# Patient Record
Sex: FEMALE | Race: WHITE | NOT HISPANIC OR LATINO | Employment: UNEMPLOYED | URBAN - NONMETROPOLITAN AREA
[De-identification: names, ages, dates, MRNs, and addresses within clinical notes are randomized per-mention and may not be internally consistent; named-entity substitution may affect disease eponyms.]

---

## 2022-04-06 ENCOUNTER — TRANSCRIBE ORDERS (OUTPATIENT)
Dept: PHYSICAL THERAPY | Facility: HOSPITAL | Age: 22
End: 2022-04-06

## 2022-04-06 DIAGNOSIS — F80.9 DEVELOPMENTAL DISORDER OF SPEECH AND LANGUAGE, UNSPECIFIED: ICD-10-CM

## 2022-04-06 DIAGNOSIS — Q90.9 DOWN'S SYNDROME: Primary | ICD-10-CM

## 2022-04-12 ENCOUNTER — HOSPITAL ENCOUNTER (OUTPATIENT)
Dept: SPEECH THERAPY | Facility: HOSPITAL | Age: 22
Setting detail: THERAPIES SERIES
Discharge: HOME | End: 2022-04-12

## 2022-04-12 DIAGNOSIS — R47.1 DYSARTHRIA: Primary | ICD-10-CM

## 2022-04-12 PROCEDURE — 92523 SPEECH SOUND LANG COMPREHEN: CPT | Performed by: SPEECH-LANGUAGE PATHOLOGIST

## 2022-04-12 NOTE — THERAPY EVALUATION
Outpatient Speech Language Pathology   Adult Speech Language Cognitive Initial Evaluation  [facility]     Patient Name: Kourtney Townsend  : 2000  MRN: [id number]  Today's Date: 2022        Visit Date: 2022   There is no problem list on file for this patient.       Past Medical History:   Diagnosis Date   • ADHD (attention deficit hyperactivity disorder)    • Alopecia    • Asthma    • Down syndrome         History reviewed. No pertinent surgical history.      Visit Dx:    ICD-10-CM ICD-9-CM   1. Dysarthria  R47.1 784.51            OP SLP Assessment/Plan - 22 0807        SLP Assessment    Functional Problems Speech Language- Adult/Cognition  -EC    Impact on Function: Adult Speech Language/Cognition Restrictions in personal and social life;Difficulty communicating wants, needs, and ideas  -EC    Clinical Impression: Speech Language-Adult/Congnition Moderate:;Other (comment)   dysarthria -EC    Functional Problems Comment Kourtney has decresed intelligability that is associated with her down syndrom dx.  Her dx of ADHD also affects her ability to utilize compensatory strategies to improve her intelligability.  -EC    Clinical Impression Comments Kourtney will benefit from speech therapy to address education and implimentation of compensatory strategies to improve intelligability  -EC    SLP Diagnosis dysarthria  -EC    Prognosis Fair (comment)  -EC    Patient/caregiver participated in establishment of treatment plan and goals Yes  -EC    Patient would benefit from skilled therapy intervention Yes  -EC       SLP Plan    Frequency 1xwk  -EC    Duration till d/c  -EC    Planned CPT's? SLP INDIVIDUAL SPEECH THERAPY: 70681  -EC    Expected Duration of Therapy Session (SLP Eval) 45  -EC          User Key  (r) = Recorded By, (t) = Taken By, (c) = Cosigned By    Initials Name Provider Type    Melissa Tenorio CCC-SLP Speech and Language Pathologist                 SLP SLC Evaluation -  04/12/22 0807        Communication Assessment/Intervention    Document Type evaluation  -EC    Subjective Information no complaints  -EC    Patient Observations alert;cooperative;agree to therapy  -EC    Patient/Family/Caregiver Comments/Observations mother in attendance  -EC    Patient Effort adequate  -EC    Comment Pt graduated H.S. 5/2020 and now lives at home with mom, step dad, adult foster.  mom says she was getting speech therapy about 30 mins a month during her final years in highschool  -EC       General Information    Patient Profile Reviewed yes  -EC    Pertinent History Of Current Problem pt is verbose with run on conversations.  she demonstrates dysarthria with decreased articulatory precision, rate, prosody, and breath support.  she also has a mild tongue lateralization in conversational speech.  -EC    Precautions/Limitations, Vision WFL;for purposes of eval  -EC    Precautions/Limitations, Hearing WFL  -EC    Patient Level of Education 12 grade  -EC    Prior Level of Function-Communication cognitive-linguistic impairment;other (see comments)   downs syndrome -EC    Plans/Goals Discussed with patient;family  -EC    Barriers to Rehab cognitive status;previous functional deficit  -EC    Patient's Goals for Discharge functional communication  -EC    Family Goals for Discharge functional communication  -EC       Pain    Additional Documentation Pain Scale: Numbers Pre/Post-Treatment (Group)  -EC       Pain Scale: Numbers Pre/Post-Treatment    Pretreatment Pain Rating 0/10 - no pain  -EC    Posttreatment Pain Rating 0/10 - no pain  -EC       Oral Motor Structure and Function    Oral Motor Structure and Function mild impairment  -EC    Oral Lesions or Structural Abnormalities and/or variants down syndrom characteristis of small mouth, large tongue, imprecise articulation  -EC    Dentition Assessment natural, present and adequate  -EC    Mucosal Quality moist, healthy  -EC       Motor Speech  Assessment/Intervention    Characteristics Consistent with Dysarthria increased rate;decreased articulation;decreased prosody;decreased breath support  -EC    Conversational Speech (Communication) moderate impairment  -EC       SLP Evaluation Clinical Impressions    SLP Diagnosis dysarthria  -EC    Rehab Potential/Prognosis guarded  -EC    SLC Criteria for Skilled Therapy Interventions Met yes  -EC    Functional Impact functional impact in social situations;difficulty communicating in an emergency;restrictions in personal and social life  -EC       Recommendations    Therapy Frequency (SLP SLC) 1 day per week  -EC    Predicted Duration Therapy Intervention (Days) until discharge  -EC    Anticipated Discharge Disposition (SLP) home with assist  -EC    Communication Strategy Suggestions eliminate distractions when speaking with patient;avoid open-ended questions;other (see comments)   encourage slow rate/pacing -EC          User Key  (r) = Recorded By, (t) = Taken By, (c) = Cosigned By    Initials Name Provider Type    Melissa Tenorio CCC-SLP Speech and Language Pathologist                                OP SLP Education     Row Name 04/12/22 0807       Education    Barriers to Learning Decreased comprehension  -EC    Action Taken to Address Barriers education with mother for HEP  -EC    Education Provided Described results of evaluation;Family/caregivers expressed understanding of evaluation;Family/caregivers participated in establishing goals and treatment plan;Patient demonstrated recommended strategies;Family/caregivers demonstrated recommended strategies;Patient requires further education on strategies, risks;Family/caregivers require further education on strategies, risks  -EC    Assessed Learning needs;Learning motivation;Learning preferences;Learning readiness  -EC    Learning Motivation Moderate  -EC    Learning Method Explanation;Demonstration  -EC    Teaching Response Demonstrated understanding  -EC     Education Comments pt demonstrated modified finger pacing  -EC          User Key  (r) = Recorded By, (t) = Taken By, (c) = Cosigned By    Initials Name Effective Dates    EC Melissa Gastelum, CCC-SLP 06/16/21 -                SLP OP Goals     Row Name 04/12/22 1054 04/12/22 0807       Goal Type Needed    Goal Type Needed -- Dysarthria;Other Adult Goals  -EC       Subjective Comments    Subjective Comments -- Kourtney comes to [facility] with a lifetime of speech therapy in the school system.  She has struggled with decrased inteligability 10% to an unfamiliar listener.  Her mother often serves as her .  Kourtney actually has a large vocabulary and the ability to formulate long, run-on sentences, but her rate of speech is too fast and her prosody lacks intonational changes that help the listener gain meaning.  -EC       Dysarthria Goals     Dysarthria LTG's -- Patient will be able to communicate a message that is comprehensible to familiar/unfamiliar listeners utilizing verbal speech and augmentative strategies  -EC    Patient will be able to communicate a message that is comprehensible to familiar/unfamiliar listeners utilizing verbal speech and augmentative strategies -- 70%:  -EC    Status: Patient will be able to communicate a message that is comprehensible to familiar/unfamiliar listeners utilizing verbal speech and augmentative strategies -- New  -EC    Comments: Patient will be able to communicate a message that is comprehensible to familiar/unfamiliar listeners utilizing verbal speech and augmentative strategies -- pt speech is fast and lacks articulatory precision which affects her overall intelligabilty.  she is understood about 10% of the time in connected speech to an unfamiliar listener  -EC     Dysarthria STG's -- Patient will apply compensatory strategies to improve intelligibility of speech during in spontaneous speech  -EC    Patient will apply compensatory strategies to  improve intelligibility of speech during in spontaneous speech -- 70%:  -EC    Status: Patient will apply compensatory strategies to improve intelligibility of speech during in spontaneous speech -- New  -EC    Comments: Patient will apply compensatory strategies to improve intelligibility of speech during in spontaneous speech -- discussed finger tapping to pace patient during connected speech.  -EC       Other Goals    Other Adult Goal- 1 -- Mom will encourage HEP to facilitate carryover and report back weekly on progress  -EC    Status: Other Adult Goal- 1 -- New  -EC    Comments: Other Adult Goal- 1 -- mom encouraged to cue for slow rate and finger tapping  -EC       SLP Time Calculation    SLP Goal Re-Cert Due Date 05/12/22  -EC 05/12/22  -EC          User Key  (r) = Recorded By, (t) = Taken By, (c) = Cosigned By    Initials Name Provider Type    EC Melissa Gastelum CCC-SLP Speech and Language Pathologist                     Time Calculation:   SLP Start Time: 0807  SLP Stop Time: 0900  SLP Time Calculation (min): 53 min  Total Timed Code Minutes- SLP: 53 minute(s)  Untimed Charges  SLP Eval/Re-eval : ST Eval Speech and Production w/ Language - 56102  31807-LC Eval Speech and Production w/ Language Minutes: 53  Total Minutes  Untimed Charges Total Minutes: 53   Total Minutes: 53    Therapy Charges for Today     Code Description Service Date Service Provider Modifiers Qty    14620910613 HC ST EVAL SPEECH AND PROD W LANG  4 4/12/2022 Melissa Gastelum CCC-SLP GN 1                   SIMON Stringer  4/12/2022

## 2022-04-19 ENCOUNTER — HOSPITAL ENCOUNTER (OUTPATIENT)
Dept: SPEECH THERAPY | Facility: HOSPITAL | Age: 22
Setting detail: THERAPIES SERIES
Discharge: HOME | End: 2022-04-19

## 2022-04-19 DIAGNOSIS — R47.1 DYSARTHRIA: Primary | ICD-10-CM

## 2022-04-19 PROCEDURE — 92507 TX SP LANG VOICE COMM INDIV: CPT | Performed by: SPEECH-LANGUAGE PATHOLOGIST

## 2022-04-19 NOTE — THERAPY TREATMENT NOTE
Outpatient Speech Language Pathology   Adult Speech Language Cognitive Treatment Note  [facility]     Patient Name: Kia Townsend  : 2000  MRN: [id number]  Today's Date: 2022         Visit Date: 2022   There is no problem list on file for this patient.         Visit Dx:    ICD-10-CM ICD-9-CM   1. Dysarthria  R47.1 784.51        OP SLP Assessment/Plan - 22 1155        SLP Assessment    Functional Problems Speech Language- Adult/Cognition  -EC    Impact on Function: Adult Speech Language/Cognition Restrictions in personal and social life;Difficulty communicating wants, needs, and ideas  -EC    Clinical Impression: Speech Language-Adult/Congnition Moderate:;Other (comment)   dysarthria -EC    Functional Problems Comment kia's deficits associated with down syndrom and ADHD dx affects her ability to utilize strategies for increased intelligability  -EC    Clinical Impression Comments Kia will benefit from speech therapy to address education and implimentation of compensatory strategies to improve intelligability  -EC    SLP Diagnosis dysarthria  -EC    Prognosis Fair (comment)  -EC    Patient/caregiver participated in establishment of treatment plan and goals Yes  -EC    Patient would benefit from skilled therapy intervention Yes  -EC       SLP Plan    Frequency 1xwk  -EC    Duration till d/c  -EC    Planned CPT's? SLP INDIVIDUAL SPEECH THERAPY: 23843  -EC    Expected Duration of Therapy Session (SLP Eval) 45  -EC          User Key  (r) = Recorded By, (t) = Taken By, (c) = Cosigned By    Initials Name Provider Type    Melissa Tenorio CCC-SLP Speech and Language Pathologist                                   SLP OP Goals     Row Name 22 1357 22 1151       Goal Type Needed    Goal Type Needed -- Dysarthria  -EC       Subjective Comments    Subjective Comments -- `  -EC       Dysarthria Goals    Patient will be able to communicate a message that is  comprehensible to familiar/unfamiliar listeners utilizing verbal speech and augmentative strategies -- 70%:  -EC    Status: Patient will be able to communicate a message that is comprehensible to familiar/unfamiliar listeners utilizing verbal speech and augmentative strategies -- Progressing as expected  -EC    Comments: Patient will be able to communicate a message that is comprehensible to familiar/unfamiliar listeners utilizing verbal speech and augmentative strategies -- pt speech is fast and lacks articulatory precision which affects her overall intelligabilty.  she is understood about 10% of the time in connected speech to an unfamiliar listener  -EC     Dysarthria STG's -- Patient will apply compensatory strategies to improve intelligibility of speech during in spontaneous speech  -EC    Patient will apply compensatory strategies to improve intelligibility of speech during in spontaneous speech -- 70%:  -EC    Status: Patient will apply compensatory strategies to improve intelligibility of speech during in spontaneous speech -- Progressing as expected  -EC    Comments: Patient will apply compensatory strategies to improve intelligibility of speech during in spontaneous speech -- Mom reports that kia does not like finger tapping.  This session focused on slowing rate and pronouncing each syllable in multi-syllable words.  completed worksheet on increasing word length : (ex list, listless, listlessness)  pt performed with model and improved intelligability at multisyllable level to 60%  -EC       Other Goals    Other Adult Goal- 1 -- Mom will encourage HEP to facilitate carryover and report back weekly on progress  -EC    Status: Other Adult Goal- 1 -- Progressing as expected  -EC    Comments: Other Adult Goal- 1 -- worksheet sent home to practice slow rate with mutisyllable words  -EC       SLP Time Calculation    SLP Goal Re-Cert Due Date 05/12/22  -EC 05/12/22  -EC          User Key  (r) = Recorded By,  (t) = Taken By, (c) = Cosigned By    Initials Name Provider Type    EC Melissa Gastelum CCC-SLP Speech and Language Pathologist               OP SLP Education     Row Name 04/19/22 1155       Education    Barriers to Learning Decreased comprehension  -EC    Action Taken to Address Barriers Mother attended session  -EC    Education Provided Patient demonstrated recommended strategies;Family/caregivers demonstrated recommended strategies;Patient requires further education on strategies, risks;Family/caregivers require further education on strategies, risks  -EC    Assessed Learning needs;Learning motivation;Learning preferences;Learning readiness  -EC    Learning Motivation Moderate  -EC    Learning Method Explanation;Demonstration  -EC    Teaching Response Verbalized understanding  -EC    Education Comments pt participated in strategies to decrease rate in multi syllablic words and short phrases  -EC          User Key  (r) = Recorded By, (t) = Taken By, (c) = Cosigned By    Initials Name Effective Dates    EC Melissa Gastelum CCC-SLP 06/16/21 -                      Time Calculation:   SLP Start Time: 1155  SLP Stop Time: 1235  SLP Time Calculation (min): 40 min  Total Timed Code Minutes- SLP: 40 minute(s)  Untimed Charges  95704-TP Treatment/ST Modification Prosth Aug Alter : 40  Total Minutes  Untimed Charges Total Minutes: 40   Total Minutes: 40    Therapy Charges for Today     Code Description Service Date Service Provider Modifiers Qty    17539091167 HC ST TREATMENT SPEECH 3 4/19/2022 Melissa Gastelum CCC-SLP GN 1                   SIMON Stringer  4/19/2022

## 2022-04-26 ENCOUNTER — HOSPITAL ENCOUNTER (OUTPATIENT)
Dept: SPEECH THERAPY | Facility: HOSPITAL | Age: 22
Setting detail: THERAPIES SERIES
Discharge: HOME | End: 2022-04-26

## 2022-04-26 DIAGNOSIS — R47.1 DYSARTHRIA: Primary | ICD-10-CM

## 2022-04-26 PROCEDURE — 92507 TX SP LANG VOICE COMM INDIV: CPT | Performed by: SPEECH-LANGUAGE PATHOLOGIST

## 2022-04-26 NOTE — THERAPY TREATMENT NOTE
Outpatient Speech Language Pathology   Adult Speech Language Cognitive Treatment Note  [facility]     Patient Name: Kourtney Townsend  : 2000  MRN: [id number]  Today's Date: 2022         Visit Date: 2022   There is no problem list on file for this patient.         Visit Dx:    ICD-10-CM ICD-9-CM   1. Dysarthria  R47.1 784.51        OP SLP Assessment/Plan - 22 1147        SLP Assessment    Functional Problems Speech Language- Adult/Cognition  -EC    Impact on Function: Adult Speech Language/Cognition Restrictions in personal and social life;Difficulty communicating wants, needs, and ideas  -EC    Clinical Impression: Speech Language-Adult/Congnition Moderate:   dysarthria -EC    Functional Problems Comment Kourtney has decresed intelligability that is associated with her down syndrom dx.  Her dx of ADHD also affects her ability to utilize compensatory strategies to improve her intelligability.  -EC    Clinical Impression Comments Kourtney will benefit from speech therapy to address education and implimentation of compensatory strategies to improve intelligability  -EC    SLP Diagnosis dysarthria  -EC    Prognosis Fair (comment)  -EC    Patient/caregiver participated in establishment of treatment plan and goals Yes  -EC    Patient would benefit from skilled therapy intervention Yes  -EC       SLP Plan    Frequency 1xwk  -EC    Duration till d/c  -EC    Planned CPT's? SLP INDIVIDUAL SPEECH THERAPY: 84859  -EC    Expected Duration of Therapy Session (SLP Eval) 45  -EC          User Key  (r) = Recorded By, (t) = Taken By, (c) = Cosigned By    Initials Name Provider Type    Melissa Tenorio CCC-SLP Speech and Language Pathologist                                   SLP OP Goals     Row Name 22 1448 22 1147       Goal Type Needed    Goal Type Needed -- Dysarthria  -EC       Subjective Comments    Subjective Comments -- pt attended with mom, but requestesd mom not talk during  session.  -EC       Dysarthria Goals     Dysarthria LTG's -- Patient will be able to communicate a message that is comprehensible to familiar/unfamiliar listeners utilizing verbal speech and augmentative strategies  -EC    Patient will be able to communicate a message that is comprehensible to familiar/unfamiliar listeners utilizing verbal speech and augmentative strategies -- 70%:  -EC    Status: Patient will be able to communicate a message that is comprehensible to familiar/unfamiliar listeners utilizing verbal speech and augmentative strategies -- Progressing as expected  -EC    Comments: Patient will be able to communicate a message that is comprehensible to familiar/unfamiliar listeners utilizing verbal speech and augmentative strategies -- pt speech is fast and lacks articulatory precision which affects her overall intelligabilty.  she is understood about 15% of the time in connected speech to an unfamiliar listener  -EC     Dysarthria STG's -- Patient will apply compensatory strategies to improve intelligibility of speech during in spontaneous speech  -EC    Patient will apply compensatory strategies to improve intelligibility of speech during in spontaneous speech -- 70%:  -EC    Status: Patient will apply compensatory strategies to improve intelligibility of speech during in spontaneous speech -- Progressing as expected  -EC    Comments: Patient will apply compensatory strategies to improve intelligibility of speech during in spontaneous speech -- Mom reports that kia did not participate in practice for speech agility drills until yesterday and only did so with max encouragement.  tx focused on slowing rate and pronouncing each syllable in multi-syllable words.  completed worksheet on increasing word length : (ex list, listless, listlessness)  pt required max encouragment to particpate.  voicing was very quiet  -EC       Other Goals    Other Adult Goal- 1 -- Mom will encourage HEP to facilitate  carryover and report back weekly on progress  -EC    Status: Other Adult Goal- 1 -- Progressing as expected  -EC    Comments: Other Adult Goal- 1 -- worksheet sent home to practice slow rate during agility drills for single and double syllable words.  -EC       SLP Time Calculation    SLP Goal Re-Cert Due Date 05/12/22  -EC 05/12/22  -EC          User Key  (r) = Recorded By, (t) = Taken By, (c) = Cosigned By    Initials Name Provider Type    Melissa Tenorio CCC-SLP Speech and Language Pathologist               OP SLP Education     Row Name 04/26/22 1147       Education    Barriers to Learning Decreased comprehension  -EC    Action Taken to Address Barriers mother educated with patient  -EC    Education Provided Patient demonstrated recommended strategies;Family/caregivers demonstrated recommended strategies;Patient requires further education on strategies, risks;Family/caregivers require further education on strategies, risks  -EC    Assessed Learning needs;Learning motivation;Learning preferences;Learning readiness  -EC    Learning Motivation Moderate  -EC    Learning Method Explanation;Demonstration  -EC    Teaching Response Verbalized understanding  -EC    Education Comments chart drawn on back of HEP worksheet for pt to complete 2x a day for next 5 days.  -EC          User Key  (r) = Recorded By, (t) = Taken By, (c) = Cosigned By    Initials Name Effective Dates    Melissa Tenorio CCC-SLP 06/16/21 -                      Time Calculation:   SLP Start Time: 1347  SLP Stop Time: 1435  SLP Time Calculation (min): 48 min  Total Timed Code Minutes- SLP: 48 minute(s)  Untimed Charges  30549-EQ Treatment/ST Modification Prosth Aug Alter : 48  Total Minutes  Untimed Charges Total Minutes: 48   Total Minutes: 48    Therapy Charges for Today     Code Description Service Date Service Provider Modifiers Qty    [id number]  ST TREATMENT SPEECH 3 4/26/2022 Melissa Gastelum CCC-SLP GN 1                    Melissa Gastelum, CCC-SLP  4/26/2022

## 2022-05-03 ENCOUNTER — HOSPITAL ENCOUNTER (OUTPATIENT)
Dept: SPEECH THERAPY | Facility: HOSPITAL | Age: 22
Setting detail: THERAPIES SERIES
Discharge: HOME | End: 2022-05-03

## 2022-05-03 DIAGNOSIS — R47.1 DYSARTHRIA: Primary | ICD-10-CM

## 2022-05-03 PROCEDURE — 92507 TX SP LANG VOICE COMM INDIV: CPT | Performed by: SPEECH-LANGUAGE PATHOLOGIST

## 2022-05-03 NOTE — THERAPY TREATMENT NOTE
Outpatient Speech Language Pathology   Adult Speech Language Cognitive Treatment Note  [facility]     Patient Name: Kourtney Townsend  : 2000  MRN: [id number]  Today's Date: 5/3/2022         Visit Date: 2022   There is no problem list on file for this patient.         Visit Dx:    ICD-10-CM ICD-9-CM   1. Dysarthria  R47.1 784.51        OP SLP Assessment/Plan - 22 1056        SLP Assessment    Functional Problems Speech Language- Adult/Cognition  -EC    Impact on Function: Adult Speech Language/Cognition Restrictions in personal and social life;Difficulty communicating wants, needs, and ideas  -EC    Clinical Impression: Speech Language-Adult/Congnition Moderate:   dysarthria -EC    Functional Problems Comment Kourtney has decresed intelligability that is associated with her down syndrom dx.  Her dx of ADHD also affects her ability to utilize compensatory strategies to improve her intelligability  -EC    Clinical Impression Comments Kourtney will benefit from speech therapy to address education and implimentation of compensatory strategies to improve intelligability  -EC    SLP Diagnosis dysarthria  -EC    Prognosis Fair (comment)  -EC    Patient/caregiver participated in establishment of treatment plan and goals Yes  -EC    Patient would benefit from skilled therapy intervention Yes  -EC       SLP Plan    Frequency 1xwk  -EC    Duration till d/c  -EC    Planned CPT's? SLP INDIVIDUAL SPEECH THERAPY: 23832  -EC    Expected Duration of Therapy Session (SLP Eval) 50  -EC          User Key  (r) = Recorded By, (t) = Taken By, (c) = Cosigned By    Initials Name Provider Type    Melissa Tenorio CCC-SLP Speech and Language Pathologist                                   SLP OP Goals     Row Name 22 1207 22 1056       Goal Type Needed    Goal Type Needed -- Dysarthria  -EC       Subjective Comments    Subjective Comments -- Mom accompanied Kourtney into session.  Kourtney states she  has not been practicing her speech at home.  -EC       Dysarthria Goals     Dysarthria LTG's -- Patient will be able to communicate a message that is comprehensible to familiar/unfamiliar listeners utilizing verbal speech and augmentative strategies  -EC    Patient will be able to communicate a message that is comprehensible to familiar/unfamiliar listeners utilizing verbal speech and augmentative strategies -- 70%:  -EC    Status: Patient will be able to communicate a message that is comprehensible to familiar/unfamiliar listeners utilizing verbal speech and augmentative strategies -- Progressing as expected  -EC    Comments: Patient will be able to communicate a message that is comprehensible to familiar/unfamiliar listeners utilizing verbal speech and augmentative strategies -- pt speech is fast and lacks articulatory precision which affects her overall intelligabilty.  she has specific laterlization with production of /s/ which affects her intelligability as well as decreased intonation.  she is understood about 15% of the time in connected speech to an unfamiliar listener.  she is understood about 40% to a familiar listener  -EC     Dysarthria STG's -- Patient will apply compensatory strategies to improve intelligibility of speech during in spontaneous speech  -EC    Patient will apply compensatory strategies to improve intelligibility of speech during in spontaneous speech -- 70%:  -EC    Status: Patient will apply compensatory strategies to improve intelligibility of speech during in spontaneous speech -- Progressing as expected  -EC    Comments: Patient will apply compensatory strategies to improve intelligibility of speech during in spontaneous speech -- tx focused on slowing rate and pronouncing each syllable in multi-syllable words.  completed worksheet on increasing sentence length. voicing was very quiet.  significant time was spent with tongue placement for /s/ production  -EC       Other Goals     Other Adult Goal- 1 -- Mom will encourage HEP to facilitate carryover and report back weekly on progress  -EC    Status: Other Adult Goal- 1 -- Progress slower than expected  -EC    Comments: Other Adult Goal- 1 -- Kourtney reports she is not practicing at home and mom confirms.  pt encouraged to practice /s/ in fortn of a mirror. .  -EC       SLP Time Calculation    SLP Goal Re-Cert Due Date 05/12/22  -EC 05/12/22  -EC          User Key  (r) = Recorded By, (t) = Taken By, (c) = Cosigned By    Initials Name Provider Type    Melissa Tenorio CCC-SLP Speech and Language Pathologist               OP SLP Education     Row Name 05/03/22 1056       Education    Barriers to Learning Decreased comprehension  -EC    Action Taken to Address Barriers Mother educated with patient  -EC    Education Provided Patient demonstrated recommended strategies;Family/caregivers demonstrated recommended strategies;Patient requires further education on strategies, risks;Family/caregivers require further education on strategies, risks  -EC    Assessed Learning needs;Learning motivation;Learning preferences;Learning readiness  -EC    Learning Motivation Moderate  -EC    Learning Method Explanation;Demonstration  -EC    Teaching Response Verbalized understanding;Demonstrated understanding  -EC    Education Comments production of /s/ in isolation  -EC          User Key  (r) = Recorded By, (t) = Taken By, (c) = Cosigned By    Initials Name Effective Dates    Melissa Tenorio CCC-SLP 06/16/21 -                      Time Calculation:   SLP Start Time: 1056  SLP Stop Time: 1150  SLP Time Calculation (min): 54 min  Total Timed Code Minutes- SLP: 54 minute(s)  Untimed Charges  82211-KO Treatment/ST Modification Prosth Aug Alter : 54  Total Minutes  Untimed Charges Total Minutes: 54   Total Minutes: 54    Therapy Charges for Today     Code Description Service Date Service Provider Modifiers Qty    51762142175 HC ST TREATMENT SPEECH 4  5/3/2022 Melissa Gastelum CCC-SLP GN 1                   SIMON Stringer  5/3/2022

## 2022-05-10 ENCOUNTER — APPOINTMENT (OUTPATIENT)
Dept: SPEECH THERAPY | Facility: HOSPITAL | Age: 22
End: 2022-05-10

## 2022-05-17 ENCOUNTER — HOSPITAL ENCOUNTER (OUTPATIENT)
Dept: SPEECH THERAPY | Facility: HOSPITAL | Age: 22
Setting detail: THERAPIES SERIES
Discharge: HOME | End: 2022-05-17

## 2022-05-17 DIAGNOSIS — R47.1 DYSARTHRIA: Primary | ICD-10-CM

## 2022-05-17 PROCEDURE — 92507 TX SP LANG VOICE COMM INDIV: CPT | Performed by: SPEECH-LANGUAGE PATHOLOGIST

## 2022-05-17 NOTE — THERAPY PROGRESS REPORT/RE-CERT
Outpatient Speech Language Pathology   Adult Speech Language Cognitive Progress Note  [facility]     Patient Name: Kourtney Townsend  : 2000  MRN: [id number]  Today's Date: 2022        Visit Date: 2022   There is no problem list on file for this patient.       Past Medical History:   Diagnosis Date   • ADHD (attention deficit hyperactivity disorder)    • Alopecia    • Asthma    • Down syndrome         History reviewed. No pertinent surgical history.      Visit Dx:    ICD-10-CM ICD-9-CM   1. Dysarthria  R47.1 784.51            OP SLP Assessment/Plan - 22 1124        SLP Assessment    Functional Problems Speech Language- Adult/Cognition  -EC    Impact on Function: Adult Speech Language/Cognition Restrictions in personal and social life;Difficulty communicating wants, needs, and ideas;Difficulty communicating in a medical emergency  -EC    Clinical Impression: Speech Language-Adult/Congnition Moderate:;Other (comment)   dysarthria -EC    Functional Problems Comment --   Kourtney has decresed intelligability that is associated with her down syndrom dx.  she is learning to utilize compensatory strategies to increase her intelligability including pacing and overarticulation -EC    Clinical Impression Comments Kourtney continues to benefit from Speech therapy to address education and implimentation of compensatory strategies and articulatory placement to improve intelligability with familiar and unfamiliar listeners.  -EC    SLP Diagnosis dysarthria  -EC    Prognosis Fair (comment)  -EC    Patient/caregiver participated in establishment of treatment plan and goals Yes  -EC    Patient would benefit from skilled therapy intervention Yes  -EC       SLP Plan    Frequency 1xwk  -EC    Duration 6 of 20  -EC    Planned CPT's? SLP INDIVIDUAL SPEECH THERAPY: 42634  -EC    Expected Duration of Therapy Session (SLP Eval) 50  -EC          User Key  (r) = Recorded By, (t) = Taken By, (c) = Cosigned By     Initials Name Provider Type    EC Melissa Gastelum CCC-SLP Speech and Language Pathologist                 SLP [facility] Evaluation - 05/17/22 1124        Communication Assessment/Intervention    Document Type progress note/recertification  -EC    Subjective Information no complaints  -EC    Patient Observations alert;cooperative;agree to therapy  -EC    Patient/Family/Caregiver Comments/Observations attended independently with Mom in parking lot  -EC    Patient Effort adequate  -EC    Comment pt less talkative today and does not want to use pacing techniques  -EC       General Information    Patient Profile Reviewed yes  -EC    Precautions/Limitations, Vision WFL;for purposes of eval  -EC    Precautions/Limitations, Hearing WFL  -EC    Prior Level of Function-Communication cognitive-linguistic impairment;other (see comments)   downs syndrome -EC    Plans/Goals Discussed with patient  -EC    Barriers to Rehab cognitive status;previous functional deficit  -EC    Patient's Goals for Discharge functional communication  -EC    Family Goals for Discharge functional communication  -EC       Pain Scale: Numbers Pre/Post-Treatment    Pretreatment Pain Rating 0/10 - no pain  -EC    Posttreatment Pain Rating 0/10 - no pain  -EC       Oral Motor Structure and Function    Oral Motor Structure and Function mild impairment  -EC    Dentition Assessment natural, present and adequate  -EC    Mucosal Quality moist, healthy  -EC       Motor Speech Assessment/Intervention    Characteristics Consistent with Dysarthria increased rate;decreased articulation;decreased prosody;decreased breath support  -EC    Conversational Speech (Communication) moderate impairment  -EC       SLP Evaluation Clinical Impressions    SLP Diagnosis articulation disorder; dysarthria  -EC    Rehab Potential/Prognosis guarded  -EC    [facility] Criteria for Skilled Therapy Interventions Met yes  -EC    Functional Impact functional impact in social situations;difficulty  communicating in an emergency;restrictions in personal and social life  -EC       Recommendations    Therapy Frequency (SLP SLC) 1 day per week  -EC    Predicted Duration Therapy Intervention (Days) until discharge  -EC    Anticipated Discharge Disposition (SLP) home with assist  -EC    Communication Strategy Suggestions eliminate distractions when speaking with patient;avoid open-ended questions;other (see comments)   encourage slow rate/pacing -EC          User Key  (r) = Recorded By, (t) = Taken By, (c) = Cosigned By    Initials Name Provider Type    Melissa Tenorio CCC-SLP Speech and Language Pathologist                                OP SLP Education     Row Name 05/17/22 1124       Education    Barriers to Learning Decreased comprehension  -EC    Action Taken to Address Barriers mother education after session as she was not available during tx.  -EC    Education Provided Family/caregivers participated in establishing goals and treatment plan;Patient demonstrated recommended strategies;Family/caregivers demonstrated recommended strategies;Patient requires further education on strategies, risks;Family/caregivers require further education on strategies, risks  -EC    Assessed Learning needs;Learning motivation;Learning preferences;Learning readiness  -EC    Learning Motivation Moderate  -EC    Learning Method Written materials  -EC    Teaching Response Verbalized understanding;Demonstrated understanding  -EC    Education Comments use of pacing techniques and production of /s,z/ at word initial level.  -EC          User Key  (r) = Recorded By, (t) = Taken By, (c) = Cosigned By    Initials Name Effective Dates    Melissa Tenorio CCC-SLP 06/16/21 -                SLP OP Goals     Row Name 05/17/22 1202 05/17/22 112       Goal Type Needed    Goal Type Needed -- Dysarthria  -EC       Subjective Comments    Subjective Comments -- initially, patient was quiet and withdrawn, but she did break out of her  shell and communicate openly.  she requires max cues to use pacing strategies.  -EC       Dysarthria Goals     Dysarthria LTG's -- Patient will be able to communicate a message that is comprehensible to familiar/unfamiliar listeners utilizing verbal speech and augmentative strategies  -EC    Patient will be able to communicate a message that is comprehensible to familiar/unfamiliar listeners utilizing verbal speech and augmentative strategies -- 80%:  -EC    Status: Patient will be able to communicate a message that is comprehensible to familiar/unfamiliar listeners utilizing verbal speech and augmentative strategies -- Progressing as expected  -EC    Comments: Patient will be able to communicate a message that is comprehensible to familiar/unfamiliar listeners utilizing verbal speech and augmentative strategies -- pt speech is fast and lacks articulatory precision which affects her overall intelligabilty.  she has specific laterlization with production of /s/ and /z/ which affects her intelligability.  she is understood about 20% of the time in connected speech to an unfamiliar listener.  she has learned strategies to increase intelligability including slow rate, pacing, and overarticulation.  she requires max cues to use strategies.  Mom indicates max cues are used at home to impliment strategies.  -EC     Dysarthria STG's -- Patient will apply compensatory strategies to improve intelligibility of speech during in spontaneous speech  -EC    Patient will apply compensatory strategies to improve intelligibility of speech during in spontaneous speech -- 80%:  -EC    Status: Patient will apply compensatory strategies to improve intelligibility of speech during in spontaneous speech -- Progressing as expected  -EC    Comments: Patient will apply compensatory strategies to improve intelligibility of speech during in spontaneous speech -- tx focused on slowing rate and pacing at the syllable or word level in connected  speech.  significant time was spent with tongue placement for /s and z/ production  -EC       Other Goals    Other Adult Goal- 1 -- Mom will encourage HEP to facilitate carryover and report back weekly on progress  -EC    Status: Other Adult Goal- 1 -- Progress slower than expected  -EC    Comments: Other Adult Goal- 1 -- Kourtney is more aware of pacing techniques when discussed in tx.  she is producing /s,z/ with encouragement in initial position of words.  -EC       SLP Time Calculation    SLP Goal Re-Cert Due Date 06/16/22  -EC 06/16/22  -EC          User Key  (r) = Recorded By, (t) = Taken By, (c) = Cosigned By    Initials Name Provider Type    EC Melissa Gastelum CCC-SLP Speech and Language Pathologist                     Time Calculation:   SLP Start Time: 1124  SLP Stop Time: 1210  SLP Time Calculation (min): 46 min  Total Timed Code Minutes- SLP: 46 minute(s)    Therapy Charges for Today     Code Description Service Date Service Provider Modifiers Qty    [id number]  ST TREATMENT SPEECH 3 5/17/2022 Melissa Gastelum CCC-SLP GN 1                   SIMON Stringer  5/17/2022

## 2022-05-24 ENCOUNTER — APPOINTMENT (OUTPATIENT)
Dept: SPEECH THERAPY | Facility: HOSPITAL | Age: 22
End: 2022-05-24

## 2022-05-31 ENCOUNTER — APPOINTMENT (OUTPATIENT)
Dept: SPEECH THERAPY | Facility: HOSPITAL | Age: 22
End: 2022-05-31

## 2022-06-07 ENCOUNTER — HOSPITAL ENCOUNTER (OUTPATIENT)
Dept: SPEECH THERAPY | Facility: HOSPITAL | Age: 22
Setting detail: THERAPIES SERIES
Discharge: HOME | End: 2022-06-07

## 2022-06-07 DIAGNOSIS — R47.1 DYSARTHRIA: Primary | ICD-10-CM

## 2022-06-07 PROCEDURE — 92507 TX SP LANG VOICE COMM INDIV: CPT | Performed by: SPEECH-LANGUAGE PATHOLOGIST

## 2022-06-07 NOTE — THERAPY TREATMENT NOTE
Outpatient Speech Language Pathology   Adult Speech Language Cognitive Treatment Note  [facility]     Patient Name: Kourtney Townsend  : 2000  MRN: [id number]  Today's Date: 2022         Visit Date: 2022   There is no problem list on file for this patient.         Visit Dx:    ICD-10-CM ICD-9-CM   1. Dysarthria  R47.1 784.51        OP SLP Assessment/Plan - 22 1103        SLP Assessment    Functional Problems Speech Language- Adult/Cognition  -EC    Impact on Function: Adult Speech Language/Cognition Restrictions in personal and social life;Difficulty communicating wants, needs, and ideas;Difficulty communicating in a medical emergency  -EC    Clinical Impression: Speech Language-Adult/Congnition Moderate:   dysarthria -EC    Functional Problems Comment Kourtney has decresed intelligability that is associated with her down syndrom dx.  Her dx of ADHD also affects her ability to utilize compensatory strategies to improve her intelligability.  She does not like to respond to her mother's attempts at correction/imiatation at home.  -EC    Clinical Impression Comments Kourtney contines to benefit from Speech therapy to address improving intelligability at the connected speech level.  We added goals for elimination of laterlization this session at the phrase level  -EC    SLP Diagnosis dysarthria  -EC    Prognosis Fair (comment)  -EC    Patient/caregiver participated in establishment of treatment plan and goals Yes  -EC    Patient would benefit from skilled therapy intervention Yes  -EC       SLP Plan    Frequency 1xwk  -EC    Duration 7 of 20  -EC    Planned CPT's? SLP INDIVIDUAL SPEECH THERAPY: 89126  -EC    Expected Duration of Therapy Session (SLP Eval) 50  -EC    Plan Comments updated goals this date.  -EC          User Key  (r) = Recorded By, (t) = Taken By, (c) = Cosigned By    Initials Name Provider Type    Melissa Tenorio CCC-SLP Speech and Language Pathologist                                    SLP OP Goals     Row Name 06/07/22 1131 06/07/22 1103       Goal Type Needed    Goal Type Needed -- Dysarthria  -EC       Subjective Comments    Subjective Comments -- Kourtney attended session independently.  she partipated in all tasks.  she is talkative and friendly.  -EC       Dysarthria Goals     Dysarthria LTG's -- Patient will be able to communicate a message that is comprehensible to familiar/unfamiliar listeners utilizing verbal speech and augmentative strategies  -EC    Patient will be able to communicate a message that is comprehensible to familiar/unfamiliar listeners utilizing verbal speech and augmentative strategies -- 80%:  -EC    Status: Patient will be able to communicate a message that is comprehensible to familiar/unfamiliar listeners utilizing verbal speech and augmentative strategies -- Progressing as expected  -EC    Comments: Patient will be able to communicate a message that is comprehensible to familiar/unfamiliar listeners utilizing verbal speech and augmentative strategies -- pt speech is fast and lacks articulatory precision which affects her overall intelligabilty.  she has specific laterlization with production of /s/ and /z/ which affects her intelligability.  she is understood about 20% of the time in connected speech to an unfamiliar listener.  she has learned strategies to increase intelligability including slow rate, pacing, and overarticulation.  she requires max cues to use strategies.  Mom indicates max cues are used at home to impliment strategies.  -EC     Dysarthria STG's -- Patient will apply compensatory strategies to improve intelligibility of speech during in spontaneous speech  -EC    Patient will apply compensatory strategies to improve intelligibility of speech during in spontaneous speech -- 80%:  -EC    Status: Patient will apply compensatory strategies to improve intelligibility of speech during in spontaneous speech -- Progressing as  expected  -EC    Comments: Patient will apply compensatory strategies to improve intelligibility of speech during in spontaneous speech -- tx focused on slowing rate and pacing at the syllable or word level in connected speech.  significant time was spent with tongue placement for /s and z/ production  -EC       Other Goals    Other Adult Goal- 1 -- Mom will encourage HEP to facilitate carryover and report back weekly on progress  -EC    Status: Other Adult Goal- 1 -- Progress slower than expected  -EC    Comments: Other Adult Goal- 1 -- Kourtney is more aware of pacing techniques when discussed in tx.  she is producing /s,z/ with encouragement in initial position of words.  -EC    Other Adult Goal- 2 -- Pt will eliminate laterlization during production of /s/ and /s/ blends at the phrase level 6/10 trials.  -EC    Status: Other Adult Goal- 2 -- New  -EC    Comments: Other Adult Goal- 2 -- Kourtney requires max cues to produce /s/ at inital position of words.  30% imitation accuracy.  -EC       SLP Time Calculation    SLP Goal Re-Cert Due Date 06/16/22  -EC 06/16/22  -EC          User Key  (r) = Recorded By, (t) = Taken By, (c) = Cosigned By    Initials Name Provider Type    Melissa Tenorio CCC-SLP Speech and Language Pathologist               OP SLP Education     Row Name 06/07/22 1103       Education    Barriers to Learning Decreased comprehension;Family was not available  -EC    Education Provided Patient participated in establishing goals and treatment plan;Patient demonstrated recommended strategies;Patient requires further education on strategies, risks  -EC    Assessed Learning needs;Learning motivation;Learning preferences;Learning readiness  -EC    Learning Motivation Moderate  -EC    Learning Method Explanation;Demonstration  -EC    Teaching Response Verbalized understanding;Demonstrated understanding  -EC    Education Comments /sk/ words and phrases with pacing.  max cues at connected speech level   -EC          User Key  (r) = Recorded By, (t) = Taken By, (c) = Cosigned By    Initials Name Effective Dates    EC Melissa Gastelum CCC-SLP 06/16/21 -                      Time Calculation:   SLP Start Time: 1103  SLP Stop Time: 1156  SLP Time Calculation (min): 53 min  Total Timed Code Minutes- SLP: 53 minute(s)  Untimed Charges  [id number] Treatment/ST Modification Prosth Aug Alter : 53  Total Minutes  Untimed Charges Total Minutes: 53   Total Minutes: 53    Therapy Charges for Today     Code Description Service Date Service Provider Modifiers Qty    [id number]  ST TREATMENT SPEECH 4 6/7/2022 Melissa Gastelum CCC-SLP GN 1                   SIMON Stringer  6/7/2022

## 2022-06-14 ENCOUNTER — HOSPITAL ENCOUNTER (OUTPATIENT)
Dept: SPEECH THERAPY | Facility: HOSPITAL | Age: 22
Setting detail: THERAPIES SERIES
Discharge: HOME | End: 2022-06-14

## 2022-06-14 DIAGNOSIS — R47.1 DYSARTHRIA: Primary | ICD-10-CM

## 2022-06-14 PROCEDURE — 92507 TX SP LANG VOICE COMM INDIV: CPT | Performed by: SPEECH-LANGUAGE PATHOLOGIST

## 2022-06-14 NOTE — THERAPY PROGRESS REPORT/RE-CERT
Outpatient Speech Language Pathology   Adult Speech Language Cognitive Re-Assessment  [facility]     Patient Name: Kourtney Townsend  : 2000  MRN: [id number]  Today's Date: 2022        Visit Date: 2022   There is no problem list on file for this patient.       Past Medical History:   Diagnosis Date   • ADHD (attention deficit hyperactivity disorder)    • Alopecia    • Asthma    • Down syndrome         No past surgical history on file.      Visit Dx:    ICD-10-CM ICD-9-CM   1. Dysarthria  R47.1 784.51            OP SLP Assessment/Plan - 22 1105        SLP Assessment    Functional Problems Speech Language- Adult/Cognition  -EC    Impact on Function: Adult Speech Language/Cognition Difficulty communicating wants, needs, and ideas;Difficulty communicating in a medical emergency;Restrictions in personal and social life  -EC    Clinical Impression: Speech Language-Adult/Congnition Moderate-Severe:;Other (comment)   dysarthria -EC    Functional Problems Comment Kourtney has decresed intelligability that is associated with her down syndrom dx.  Her dx of ADHD also affects her ability to utilize compensatory strategies to improve her intelligability. she is understood about 20% of the time.  -EC    Clinical Impression Comments Kourtney will continue to benefit from Speech therapy to address education and implementation of compensastory strategies for articulatory precision, pacing and intonation to make herself more intelligable.  -EC    SLP Diagnosis dysarhtria  -EC    Prognosis Fair (comment)  -EC    Patient/caregiver participated in establishment of treatment plan and goals Yes  -EC    Patient would benefit from skilled therapy intervention Yes  -EC       SLP Plan    Frequency 1xwk  -EC    Duration 8 of 20  -EC    Planned CPT's? SLP INDIVIDUAL SPEECH THERAPY: 17937  -EC    Expected Duration of Therapy Session (SLP Eval) 50  -EC    Plan Comments updated goals to reflect progress and  individualize strategies to kia's unique dysarthria problems.  -EC          User Key  (r) = Recorded By, (t) = Taken By, (c) = Cosigned By    Initials Name Provider Type    Melissa Tenorio CCC-SLP Speech and Language Pathologist                                    OP SLP Education     Row Name 06/14/22 1109       Education    Barriers to Learning Decreased comprehension  -EC    Action Taken to Address Barriers mother education after session as she was not available during tx.  -EC    Education Provided Patient participated in establishing goals and treatment plan;Family/caregivers participated in establishing goals and treatment plan;Patient demonstrated recommended strategies;Family/caregivers demonstrated recommended strategies;Patient requires further education on strategies, risks;Family/caregivers require further education on strategies, risks  -EC    Assessed Learning needs;Learning motivation;Learning preferences;Learning readiness  -EC    Learning Motivation Moderate  -EC    Learning Method Explanation;Demonstration  -EC    Teaching Response Verbalized understanding;Demonstrated understanding  -EC    Education Comments Kia helped to develop 10 personal fulnctional phrases to practice pacing and articulatory precision.  -EC          User Key  (r) = Recorded By, (t) = Taken By, (c) = Cosigned By    Initials Name Effective Dates    Melissa Tenorio CCC-SLP 06/16/21 -                SLP OP Goals     Row Name 06/14/22 1207 06/14/22 1102       Goal Type Needed    Goal Type Needed -- Dysarthria  -EC       Subjective Comments    Subjective Comments -- Kia attended session independently.  Recertification completed.  -EC       Dysarthria Goals     Dysarthria LTG's -- Patient will be able to communicate a message that is comprehensible to familiar/unfamiliar listeners utilizing verbal speech and augmentative strategies  -EC    Patient will be able to communicate a message that is  comprehensible to familiar/unfamiliar listeners utilizing verbal speech and augmentative strategies -- 80%:  -EC    Status: Patient will be able to communicate a message that is comprehensible to familiar/unfamiliar listeners utilizing verbal speech and augmentative strategies -- Progressing as expected  -EC    Comments: Patient will be able to communicate a message that is comprehensible to familiar/unfamiliar listeners utilizing verbal speech and augmentative strategies -- pt speech is fast and lacks articulatory precision which affects her overall intelligabilty.  she has specific laterlization with production of /s/ and /z/ which affects her intelligability.  she is understood about 20% of the time in connected speech to an unfamiliar listener.  she has learned strategies to increase intelligability including slow rate, pacing, and overarticulation.  she requires max cues to use strategies.  Mom indicates max cues are used at home to impliment strategies.  -EC     Dysarthria STG's -- Patient will apply compensatory strategies to improve intelligibility of speech during in spontaneous speech  -EC    Patient will apply compensatory strategies to improve intelligibility of speech during in spontaneous speech -- 80%:  -EC    Status: Patient will apply compensatory strategies to improve intelligibility of speech during in spontaneous speech -- Progressing as expected  -EC    Comments: Patient will apply compensatory strategies to improve intelligibility of speech during in spontaneous speech -- tx focused on slowing rate and pacing at the syllable or word level in connected speech.  10 functional phrases were developed.  -EC       Other Goals    Other Adult Goal- 1 -- Mom will encourage HEP to facilitate carryover and report back weekly on progress  -EC    Status: Other Adult Goal- 1 -- Progress slower than expected  -EC    Comments: Other Adult Goal- 1 -- Mom is encouraging use of pacing at home during the day.  -EC     Other Adult Goal- 2 -- Pt will eliminate laterlization during production of /s/ and /s/ blends at the phrase level 6/10 trials.  -EC    Status: Other Adult Goal- 2 -- Progressing as expected  -EC    Comments: Other Adult Goal- 2 -- Kourtney is using /s/ at the word level with mod cues and 60% accuracy.  -EC    Other Adult Goal- 3 -- In order to improve intelligability, Kourtney will produce 3 syllable words in isolation with 80% accuracy.  -EC    Status: Other Adult Goal- 3 -- New  -EC    Comments: Other Adult Goal- 3 -- 25% with mod/max cues.  -EC       SLP Time Calculation    SLP Goal Re-Cert Due Date 07/14/22  -EC 07/14/22  -EC          User Key  (r) = Recorded By, (t) = Taken By, (c) = Cosigned By    Initials Name Provider Type    EC Melissa Gastelum CCC-SLP Speech and Language Pathologist                     Time Calculation:   SLP Start Time: 1105  SLP Stop Time: 1200  SLP Time Calculation (min): 55 min  Total Timed Code Minutes- SLP: 55 minute(s)  Untimed Charges  71066-BV Treatment/ST Modification Prosth Aug Alter : 55  Total Minutes  Untimed Charges Total Minutes: 55   Total Minutes: 55    Therapy Charges for Today     Code Description Service Date Service Provider Modifiers Qty    [id number]  ST TREATMENT SPEECH 4 6/14/2022 Melissa Gastelum CCC-SLP GN 1                   SIMON Stringer  6/14/2022

## 2022-06-21 ENCOUNTER — APPOINTMENT (OUTPATIENT)
Dept: SPEECH THERAPY | Facility: HOSPITAL | Age: 22
End: 2022-06-21

## 2022-06-28 ENCOUNTER — HOSPITAL ENCOUNTER (OUTPATIENT)
Dept: SPEECH THERAPY | Facility: HOSPITAL | Age: 22
Setting detail: THERAPIES SERIES
Discharge: HOME | End: 2022-06-28

## 2022-06-28 DIAGNOSIS — R47.1 DYSARTHRIA: Primary | ICD-10-CM

## 2022-06-28 PROCEDURE — 92507 TX SP LANG VOICE COMM INDIV: CPT

## 2022-06-28 NOTE — THERAPY TREATMENT NOTE
Outpatient Speech Language Pathology   Adult Speech Language Cognitive Treatment Note  [facility]     Patient Name: Kourtney Townsend  : 2000  MRN: [id number]  Today's Date: 2022         Visit Date: 2022   There is no problem list on file for this patient.         Visit Dx:    ICD-10-CM ICD-9-CM   1. Dysarthria  R47.1 784.51        OP SLP Assessment/Plan - 22 1100        SLP Assessment    Functional Problems Speech Language- Adult/Cognition  -AL    Impact on Function: Adult Speech Language/Cognition Difficulty communicating wants, needs, and ideas;Difficulty communicating in a medical emergency;Restrictions in personal and social life;Lack of insight or awareness of deficits, safety issues  -AL    Clinical Impression: Speech Language-Adult/Cognition Moderate-Severe:;Other (comment)   dysarthria -AL    Functional Problems Comment Kourtney has decreased intelligibility that is associated with her down syndrome dx.  Her dx of ADHD also affects her ability to utilize compensatory strategies to improve her intelligibility.  -AL    Clinical Impression Comments Kourtney was counseled by the clinician that she needs to slow down and hit all of her speech sounds in multisyllabic words in order for others to understand her. She had a little attitude in the beginning, but eventually was ready to actually work on her speech. Kourtney will benefit from speech therapy to address education and implementation of compensatory strategies to improve intelligibility  -AL    SLP Diagnosis Dysarthria  -AL    Prognosis Fair (comment)  -AL    Patient/caregiver participated in establishment of treatment plan and goals Yes  -AL    Patient would benefit from skilled therapy intervention Yes  -AL       SLP Plan    Frequency 1x week  -AL    Duration 9 of 20  -AL    Planned CPT's? SLP INDIVIDUAL SPEECH THERAPY: 55730  -AL    Expected Duration of Therapy Session (SLP Eval) 45  -AL    Plan Comments Continue with POC.   -AL          User Key  (r) = Recorded By, (t) = Taken By, (c) = Cosigned By    Initials Name Provider Type    Vida Tejada, SLP Speech and Language Pathologist                   SLP OP Goals     Row Name 06/28/22 1100          Goal Type Needed    Goal Type Needed Dysarthria;Other Adult Goals  -AL            Subjective Pain    Able to rate subjective pain? no  -AL            Dysarthria Goals    Patient will be able to communicate a message that is comprehensible to familiar/unfamiliar listeners utilizing verbal speech and augmentative strategies 80%:  -AL     Status: Patient will be able to communicate a message that is comprehensible to familiar/unfamiliar listeners utilizing verbal speech and augmentative strategies Progressing as expected  -AL     Comments: Patient will be able to communicate a message that is comprehensible to familiar/unfamiliar listeners utilizing verbal speech and augmentative strategies pt speech is fast and lacks articulatory precision which affects her overall intelligibility.  she has specific lateralization with production of /s/ and /z/ which affects her intelligibility. She is also dropping syllables in multisyllabic words. Watermelon becomes mamelon. She drops the middle sound. She is understood about 25% of the time in connected speech to an unfamiliar listener.  she has learned strategies to increase intelligibility including slow rate, pacing, and overarticulation.  she requires max cues to use strategies.  Mom indicates max cues are used at home to implement strategies.  -AL     Patient will apply compensatory strategies to improve intelligibility of speech during in spontaneous speech 80%:  -AL     Status: Patient will apply compensatory strategies to improve intelligibility of speech during in spontaneous speech Progressing as expected  -AL     Comments: Patient will apply compensatory strategies to improve intelligibility of speech during in spontaneous speech tx focused  on slowing rate and pacing at the syllable or word level in connected speech.  10 functional phrases were utilized. Kourtney does not like to complete this activity. She is reminded multiple times when she is speaking to slow down and she refuses to.  -AL            Other Goals    Other Adult Goal- 1 Mom will encourage HEP to facilitate carryover and report back weekly on progress  -AL     Status: Other Adult Goal- 1 Progress slower than expected  -AL     Comments: Other Adult Goal- 1 Mom is encouraging use of pacing at home during the day.  -AL     Other Adult Goal- 2 Pt will eliminate lateralization during production of /s/ and /s/ blends at the phrase level 6/10 trials.  -AL     Status: Other Adult Goal- 2 Progressing as expected  -AL     Comments: Other Adult Goal- 2 Kourtney is using /s/ at the word level with mod cues and 70% accuracy.  -AL     Other Adult Goal- 3 In order to improve intelligibility, Kourtney will produce 3 syllable words in isolation with 80% accuracy.  -AL     Status: Other Adult Goal- 3 New  -AL     Comments: Other Adult Goal- 3 30% with mod/max cues.  -AL            SLP Time Calculation    SLP Goal Re-Cert Due Date 07/14/22  -AL           User Key  (r) = Recorded By, (t) = Taken By, (c) = Cosigned By    Initials Name Provider Type    Vida Tejada, SLP Speech and Language Pathologist               OP SLP Education     Row Name 06/28/22 1100       Education    Barriers to Learning Decreased comprehension  -AL    Action Taken to Address Barriers mother education after session as she was not available during tx.  -AL    Education Provided Patient demonstrated recommended strategies;Family/caregivers require further education on strategies, risks;Family/caregivers demonstrated recommended strategies;Patient requires further education on strategies, risks  -AL    Assessed Learning needs;Learning motivation;Learning preferences;Learning readiness  -AL    Learning Motivation Moderate  -AL     Learning Method Explanation;Demonstration  -AL    Teaching Response Demonstrated understanding;Verbalized understanding  -AL    Education Comments Kourtney was instructed to slow down while talking to others.  -AL          User Key  (r) = Recorded By, (t) = Taken By, (c) = Cosigned By    Initials Name Effective Dates    Vida Tejada SLP 06/01/22 -                      Time Calculation:   SLP Start Time: 1100  SLP Stop Time: 1145  SLP Time Calculation (min): 45 min  Untimed Charges  03850-KN Treatment/ST Modification Prosth Aug Alter : 45  Total Minutes  Untimed Charges Total Minutes: 45   Total Minutes: 45    Therapy Charges for Today     Code Description Service Date Service Provider Modifiers Qty    [id number] HC ST TREATMENT SPEECH 3 6/28/2022 Vida Clement SLP GN 1          JONO HANKS  6/28/2022

## 2022-07-12 ENCOUNTER — APPOINTMENT (OUTPATIENT)
Dept: SPEECH THERAPY | Facility: HOSPITAL | Age: 22
End: 2022-07-12

## 2022-07-19 ENCOUNTER — HOSPITAL ENCOUNTER (OUTPATIENT)
Dept: SPEECH THERAPY | Facility: HOSPITAL | Age: 22
Setting detail: THERAPIES SERIES
Discharge: HOME | End: 2022-07-19

## 2022-07-19 PROCEDURE — 92507 TX SP LANG VOICE COMM INDIV: CPT

## 2022-07-19 NOTE — THERAPY PROGRESS REPORT/RE-CERT
Outpatient Speech Language Pathology   Adult Speech Language Cognitive Progress Note  [facility]     Patient Name: Kourtney Townsend  : 2000  MRN: [id number]  Today's Date: 2022         Visit Date: 2022   There is no problem list on file for this patient.         Visit Dx:  No diagnosis found.     OP SLP Assessment/Plan - 22 1054        SLP Assessment    Functional Problems Speech Language- Adult/Cognition  -AL    Impact on Function: Adult Speech Language/Cognition Difficulty communicating wants, needs, and ideas;Difficulty communicating in a medical emergency;Restrictions in personal and social life;Lack of insight or awareness of deficits, safety issues  -AL    Clinical Impression: Speech Language-Adult/Congnition Moderate-Severe:;Other (comment)   dysarthria -AL    Functional Problems Comment Kourtney has decresed intelligability that is associated with her down syndrom dx.  Her dx of ADHD also affects her ability to utilize compensatory strategies to improve her intelligability.  -AL    Clinical Impression Comments Kourtney's speech is fast and lacks articulatory precision which affects her overall intelligabilty.  she has specific laterlization with production of /s/ and /z/ which affects her intelligability. She is also dropping syllables in multisyllabic words. Watermelon becomes wamelon. She drops the middle sound. She is understood about 25% of the time in connected speech to an unfamiliar listener.  she has learned strategies to increase intelligability including slow rate, pacing, and overarticulation.  she requires max cues to use strategies.  Mom indicates max cues are used at home to impliment strategies. Today in speech we worked on multisyllabic words 3 syllables and 4 syllables- she produced these with 40% acc with no cues from the clinician. She produced these correctly with 50% acc with mod verbal cues to the clinician to slow down. Today's session also focused on  slowing rate and pacing at the syllable or word level in connected speech.  10 functional phrases were utlizied. Kourtney does not like to complete this activity. She had to be reminded multiple times throughout the session to slow down. Kourtney will benefit from speech therapy to address education and implimentation of compensatory strategies to improve intelligability.  -AL    Please refer to paper survey for additional self-reported information Yes  -AL    Please refer to items scanned into chart for additional diagnostic informaiton and handouts as provided by clinician Yes  -AL    SLP Diagnosis dysarthria  -AL    Prognosis Fair (comment)  -AL    Patient/caregiver participated in establishment of treatment plan and goals Yes  -AL    Patient would benefit from skilled therapy intervention Yes  -AL       SLP Plan    Frequency 1x per week  -AL    Duration 10 of 20  -AL    Planned CPT's? SLP INDIVIDUAL SPEECH THERAPY: 21744  -AL    Expected Duration of Therapy Session (SLP Eval) 45  -AL    Plan Comments Continue with POC.  -AL          User Key  (r) = Recorded By, (t) = Taken By, (c) = Cosigned By    Initials Name Provider Type    Vida Tejada, SLP Speech and Language Pathologist                                   SLP OP Goals     Row Name 07/19/22 1054          Goal Type Needed    Goal Type Needed Other Adult Goals;Dysarthria  -AL            Subjective Comments    Subjective Comments Kourtney attended session independently.  30 day progress note was completed. Mom asked if the session could end early due to needing to leave. She will be away out of town next week and was provided homework to do while away.  -AL            Subjective Pain    Able to rate subjective pain? yes  -AL     Pre-Treatment Pain Level 0  -AL     Post-Treatment Pain Level 0  -AL     Subjective Pain Comment Pt stated that she did not have any pain before, during, or after treatment today.  -AL            Dysarthria Goals     Dysarthria  LTG's Patient will be able to communicate a message that is comprehensible to familiar/unfamiliar listeners utilizing verbal speech and augmentative strategies  -AL     Patient will be able to communicate a message that is comprehensible to familiar/unfamiliar listeners utilizing verbal speech and augmentative strategies 80%:  -AL     Status: Patient will be able to communicate a message that is comprehensible to familiar/unfamiliar listeners utilizing verbal speech and augmentative strategies Progressing as expected  -AL     Comments: Patient will be able to communicate a message that is comprehensible to familiar/unfamiliar listeners utilizing verbal speech and augmentative strategies pt speech is fast and lacks articulatory precision which affects her overall intelligabilty.  she has specific laterlization with production of /s/ and /z/ which affects her intelligability. She is also dropping syllables in multisyllabic words. Watermelon becomes wamelon. She drops the middle sound. She is understood about 25% of the time in connected speech to an unfamiliar listener.  she has learned strategies to increase intelligability including slow rate, pacing, and overarticulation.  she requires max cues to use strategies.  Mom indicates max cues are used at home to impliment strategies. Today in speech we worked on multisyllabic words 3 syllables and 4 syllables- she produced these with 40% acc with no cues from the clinician. She produced these correctly with 50% acc with mod verbal cues to the clinician to slow down.  -AL      Dysarthria STG's Patient will apply compensatory strategies to improve intelligibility of speech during in spontaneous speech  -AL     Patient will apply compensatory strategies to improve intelligibility of speech during in spontaneous speech 80%:  -AL     Status: Patient will apply compensatory strategies to improve intelligibility of speech during in spontaneous speech Progressing as expected   -AL     Comments: Patient will apply compensatory strategies to improve intelligibility of speech during in spontaneous speech tx focused on slowing rate and pacing at the syllable or word level in connected speech.  10 functional phrases were utlizied. Kourtney does not like to complete this activity. She had to be reminded multiple times throughout the session to slow down.  -AL            Other Goals    Other Adult Goal- 1 Mom will encourage HEP to facilitate carryover and report back weekly on progress  -AL     Status: Other Adult Goal- 1 Progress slower than expected  -AL     Comments: Other Adult Goal- 1 Mom is encouraging use of pacing at home during the day.  -AL     Other Adult Goal- 2 Pt will eliminate laterlization during production of /s/ and /s/ blends at the phrase level 6/10 trials.  -AL     Status: Other Adult Goal- 2 Progressing as expected  -AL     Comments: Other Adult Goal- 2 Kourtney is using /s/ at the word level with mod cues and 70% accuracy.  -AL     Other Adult Goal- 3 In order to improve intelligability, Kourtney will produce 3 syllable words in isolation with 80% accuracy.  -AL     Status: Other Adult Goal- 3 New  -AL     Comments: Other Adult Goal- 3 30% with mod/max cues.  -AL            SLP Time Calculation    SLP Goal Re-Cert Due Date 08/18/22  -AL           User Key  (r) = Recorded By, (t) = Taken By, (c) = Cosigned By    Initials Name Provider Type    Vida Tejada, SLP Speech and Language Pathologist               OP SLP Education     Row Name 07/19/22 1054       Education    Barriers to Learning Decreased comprehension  -AL    Action Taken to Address Barriers education with mother for HEP  -AL    Education Provided Patient demonstrated recommended strategies;Family/caregivers demonstrated recommended strategies;Patient requires further education on strategies, risks;Family/caregivers require further education on strategies, risks  -AL    Assessed Learning needs;Learning  motivation;Learning readiness;Learning preferences  -AL    Learning Motivation Moderate  -AL    Learning Method Explanation;Demonstration  -AL    Teaching Response Verbalized understanding;Demonstrated understanding  -AL    Education Comments Kourtney was instructed to slow down while talking to others and to make sure she is hitting all of the syllables in words.  -AL          User Key  (r) = Recorded By, (t) = Taken By, (c) = Cosigned By    Initials Name Effective Dates    Vida Tejada SLP 06/01/22 -                 Time Calculation:   SLP Start Time: 1054  SLP Stop Time: 1132  SLP Time Calculation (min): 38 min  Untimed Charges  56765-UG Treatment/ST Modification Prosth Aug Alter : 38  Total Minutes  Untimed Charges Total Minutes: 38   Total Minutes: 38    Therapy Charges for Today     Code Description Service Date Service Provider Modifiers Qty    12708255436  ST TREATMENT SPEECH 3 7/19/2022 Vida Clement SLP GN 1          JONO HANKS  7/19/2022

## 2022-07-26 ENCOUNTER — APPOINTMENT (OUTPATIENT)
Dept: SPEECH THERAPY | Facility: HOSPITAL | Age: 22
End: 2022-07-26

## 2022-08-09 ENCOUNTER — HOSPITAL ENCOUNTER (OUTPATIENT)
Dept: SPEECH THERAPY | Facility: HOSPITAL | Age: 22
Setting detail: THERAPIES SERIES
Discharge: HOME | End: 2022-08-09

## 2022-08-09 DIAGNOSIS — R47.1 DYSARTHRIA: Primary | ICD-10-CM

## 2022-08-09 PROCEDURE — 92507 TX SP LANG VOICE COMM INDIV: CPT

## 2022-08-09 NOTE — THERAPY TREATMENT NOTE
Outpatient Speech Language Pathology   Adult Speech Language Cognitive Treatment Note  [facility]     Patient Name: Kourtney Townsend  : 2000  MRN: [id number]  Today's Date: 2022         Visit Date: 2022   There is no problem list on file for this patient.         Visit Dx:    ICD-10-CM ICD-9-CM   1. Dysarthria  R47.1 784.51        OP SLP Assessment/Plan - 22 1057        SLP Assessment    Functional Problems Speech Language- Adult/Cognition  -AL    Impact on Function: Adult Speech Language/Cognition Difficulty communicating wants, needs, and ideas;Difficulty communicating in a medical emergency;Restrictions in personal and social life;Lack of insight or awareness of deficits, safety issues  -AL    Clinical Impression: Speech Language-Adult/Cognition Moderate-Severe:;Other (comment)   dysarthria -AL    Functional Problems Comment Kourtney has decreased intelligibility that is associated with her down syndrome dx.  Her dx of ADHD also affects her ability to utilize compensatory strategies to improve her intelligibility.  -AL    Clinical Impression Comments Kourtney’s speech is fast and lacks articulatory precision, which affects her overall intelligibility.  She has specific lateralization with production of /s/ and /z/, which affects her intelligibility. She is also dropping syllables in multisyllabic words. She drops the middle sound. She is understood about 30% of the time in connected speech to an unfamiliar listener.  She has learned strategies to increase intelligibility including slow rate, pacing, and overarticulation. She requires max cues to use strategies.  Mom indicates max cues are used at home to implement strategies. Today in speech we worked on multisyllabic words 3 syllables and four syllables- she produced these with 70% acc with no cues from the clinician. She produced these correctly with 100% acc with mod verbal cues to the clinician to slow down. Treatment today   "focused on slowing rate and pacing at the syllable or word level in connected speech.  10 functional phrases were utilized. Kourtney usually speeds through this activity due to not liking it. However, today she said her functional phrases beautifully and nice and clear.  During conversation she had to be reminded to slow down multiple times. During conversation today the clinician could understand what she was talking about when she slowed down her conversation. Kourtney is using /s/ at the word level with mod cues and 65% accuracy. Kourtney will benefit from speech therapy to address education and implementation of compensatory strategies to improve intelligibility.  -AL    Please refer to paper survey for additional self-reported information Yes  -AL    Please refer to items scanned into chart for additional diagnostic information and handouts as provided by clinician Yes  -AL    SLP Diagnosis dysarthria  -AL    Prognosis Fair (comment)  -AL    Patient/caregiver participated in establishment of treatment plan and goals Yes  -AL    Patient would benefit from skilled therapy intervention Yes  -AL       SLP Plan    Frequency 1x per week  -AL    Duration 11 of 20  -AL    Planned CPT's? SLP INDIVIDUAL SPEECH THERAPY: 34801  -AL    Expected Duration of Therapy Session (SLP Eval) 45  -AL    Plan Comments Continue with POC  -AL          User Key  (r) = Recorded By, (t) = Taken By, (c) = Cosigned By    Initials Name Provider Type    Vida Tejada, SLP Speech and Language Pathologist            SLP OP Goals     Row Name 08/09/22 1057          Goal Type Needed    Goal Type Needed Other Adult Goals;Dysarthria  -AL            Subjective Comments    Subjective Comments Kourtney just came back from vacation and was in good spirits  -AL            Subjective Pain    Able to rate subjective pain? yes  -AL     Pre-Treatment Pain Level 0  -AL     Post-Treatment Pain Level 0  -AL     Subjective Pain Comment \"I have no pain " "today.\"  -AL            Dysarthria Goals     Dysarthria LTG's Patient will be able to communicate a message that is comprehensible to familiar/unfamiliar listeners utilizing verbal speech and augmentative strategies  -AL     Patient will be able to communicate a message that is comprehensible to familiar/unfamiliar listeners utilizing verbal speech and augmentative strategies 80%:  -AL     Status: Patient will be able to communicate a message that is comprehensible to familiar/unfamiliar listeners utilizing verbal speech and augmentative strategies Progressing as expected  -AL     Comments: Patient will be able to communicate a message that is comprehensible to familiar/unfamiliar listeners utilizing verbal speech and augmentative strategies pt speech is fast and lacks articulatory precision which affects her overall intelligibility.  she has specific lateralization with production of /s/ and /z/ which affects her intelligibility. She is also dropping syllables in multisyllabic words. She drops the middle sound. She is understood about 30% of the time in connected speech to an unfamiliar listener.  She has learned strategies to increase intelligibility including slow rate, pacing, and overarticulation. She requires max cues to use strategies.  Mom indicates max cues are used at home to implement strategies. Today in speech we worked on multisyllabic words 3 syllables and 4 syllables- she produced these with 70% acc with no cues from the clinician. She produced these correctly with 100% acc with mod verbal cues to the clinician to slow down.  -AL      Dysarthria STG's Patient will apply compensatory strategies to improve intelligibility of speech during in spontaneous speech  -AL     Patient will apply compensatory strategies to improve intelligibility of speech during in spontaneous speech 80%:  -AL     Status: Patient will apply compensatory strategies to improve intelligibility of speech during in spontaneous " speech Progressing as expected  -AL     Comments: Patient will apply compensatory strategies to improve intelligibility of speech during in spontaneous speech tx focused on slowing rate and pacing at the syllable or word level in connected speech.  10 functional phrases were utilized. Kourtney usually speeds through this activity due to not liking it. However, today she said her functional phrases beautifully and nice and clear.  During conversation she had to be reminded to slow down multiple times.  -AL            Other Goals    Other Adult Goal- 1 Mom will encourage HEP to facilitate carryover and report back weekly on progress  -AL     Status: Other Adult Goal- 1 Progress slower than expected  -AL     Comments: Other Adult Goal- 1 Mom is encouraging use of pacing at home during the day.  -AL     Other Adult Goal- 2 Pt will eliminate lateralization during production of /s/ and /s/ blends at the phrase level 6/10 trials.  -AL     Status: Other Adult Goal- 2 Progressing as expected  -AL     Comments: Other Adult Goal- 2 Kourtney is using /s/ at the word level with mod cues and 65% accuracy.  -AL     Other Adult Goal- 3 In order to improve intelligibility, Kourtney will produce 3 syllable words in isolation with 80% accuracy.  -AL     Status: Other Adult Goal- 3 New  -AL     Comments: Other Adult Goal- 3 70% with mod/max cues.  -AL            SLP Time Calculation    SLP Goal Re-Cert Due Date 08/18/22  -AL           User Key  (r) = Recorded By, (t) = Taken By, (c) = Cosigned By    Initials Name Provider Type    Vida Tejada, SLP Speech and Language Pathologist               OP SLP Education     Row Name 08/09/22 1057       Education    Barriers to Learning Decreased comprehension  -AL    Action Taken to Address Barriers education with mother for HEP  -AL    Education Provided Patient demonstrated recommended strategies;Family/caregivers demonstrated recommended strategies;Patient requires further education on  strategies, risks;Family/caregivers require further education on strategies, risks  -AL    Assessed Learning needs;Learning motivation;Learning readiness;Learning preferences  -AL    Learning Motivation Moderate  -AL    Learning Method Explanation;Demonstration  -AL    Teaching Response Verbalized understanding;Demonstrated understanding  -AL    Education Comments Kourtney was instructed to slow down while talking to others and to make sure she is hitting all of the syllables in words.  -AL          User Key  (r) = Recorded By, (t) = Taken By, (c) = Cosigned By    Initials Name Effective Dates    Vida Tejada SLP 06/01/22 -                 Time Calculation:   SLP Start Time: 1057  SLP Stop Time: 1145  SLP Time Calculation (min): 48 min  Untimed Charges  61205-MZ Treatment/ST Modification Prosth Aug Alter : 48  Total Minutes  Untimed Charges Total Minutes: 48   Total Minutes: 48    Therapy Charges for Today     Code Description Service Date Service Provider Modifiers Qty    80609405112 HC ST TREATMENT SPEECH 3 8/9/2022 Vida Clement SLP GN 1          JONO HANKS  8/9/2022

## 2022-08-16 ENCOUNTER — HOSPITAL ENCOUNTER (OUTPATIENT)
Dept: SPEECH THERAPY | Facility: HOSPITAL | Age: 22
Setting detail: THERAPIES SERIES
Discharge: HOME | End: 2022-08-16

## 2022-08-16 DIAGNOSIS — R47.1 DYSARTHRIA: Primary | ICD-10-CM

## 2022-08-16 PROCEDURE — 92507 TX SP LANG VOICE COMM INDIV: CPT

## 2022-08-16 NOTE — THERAPY PROGRESS REPORT/RE-CERT
Outpatient Speech Language Pathology   Adult Speech Language Cognitive Progress Note  [facility]     Patient Name: Kourtney Townsend  : 2000  MRN: [id number]  Today's Date: 2022        Visit Date: 2022   There is no problem list on file for this patient.       Past Medical History:   Diagnosis Date   • ADHD (attention deficit hyperactivity disorder)    • Alopecia    • Asthma    • Down syndrome         No past surgical history on file.      Visit Dx:    ICD-10-CM ICD-9-CM   1. Dysarthria  R47.1 784.51            OP SLP Assessment/Plan - 22 1045        SLP Assessment    Functional Problems Speech Language- Adult/Cognition  -AL    Impact on Function: Adult Speech Language/Cognition Difficulty communicating wants, needs, and ideas;Difficulty communicating in a medical emergency;Restrictions in personal and social life;Lack of insight or awareness of deficits, safety issues  -AL    Clinical Impression: Speech Language-Adult/Cognition Moderate-Severe:;Other (comment)   dysarthria -AL    Functional Problems Comment Kourtney has decreased intelligibility that is associated with her down syndrome dx.  Her dx of ADHD also affects her ability to utilize compensatory strategies to improve her intelligibility.  -AL    Clinical Impression Comments Today is Kourtney's 30 day progress note. She is making excellent progress in speech. Kourtney’s speech is fast and lacks articulatory precision, which affects her overall intelligibility.  She has specific lateralization with production of /s/ and /z/, which affects her intelligibility. Today pt speech is fast and lacks articulatory precision, which affects her overall intelligibility.  She has specific lateralization with production of /s/ and /z/, which affects her intelligibility. She is also dropping syllables in multisyllabic words. She drops the middle sound. She is understood about 35% of the time in connected speech to an unfamiliar listener.  She  has learned strategies to increase intelligibility including slow rate, pacing, and over articulation. She requires max cues to use strategies.  Mom indicates max cues are used at home to implement strategies. Today in speech we worked on multisyllabic words 3 syllables and 4 syllables- she produced these with 80% acc with no cues from the clinician. She produced these correctly with 100% acc with mod verbal cues to the clinician to slow down. We also worked on tongue twisters and sentence pacing. She was working hard on over articulating today. Tx focused on slowing rate and pacing at the syllable or word level in connected speech.  10 functional phrases were utilized. Kourtney usually speeds through this activity due to not liking it. However, today she said her functional phrases beautifully and nice and clear.  We also utilized some silly sentences today. During conversation, she had to be reminded to slow down multiple times. Mom is encouraging use of pacing at home during the day. She has begun to notice a difference in Kourtney's speech at home. Kourtney is using /s/ at the word level with mod cues and 65% accuracy.  Kourtney will benefit from speech therapy to address education and implementation of compensatory strategies to improve intelligibility.  -AL    Please refer to paper survey for additional self-reported information Yes  -AL    Please refer to items scanned into chart for additional diagnostic informaiton and handouts as provided by clinician Yes  -AL    SLP Diagnosis dysarthria  -AL    Prognosis Fair (comment)  -AL    Patient/caregiver participated in establishment of treatment plan and goals Yes  -AL    Patient would benefit from skilled therapy intervention Yes  -AL       SLP Plan    Frequency 1x per week  -AL    Duration 12 of 20  -AL    Planned CPT's? SLP INDIVIDUAL SPEECH THERAPY: 04056  -AL    Expected Duration of Therapy Session (SLP Eval) 45  -AL    Plan Comments Continue with POC  -AL     "      User Key  (r) = Recorded By, (t) = Taken By, (c) = Cosigned By    Initials Name Provider Type    Vida Tejada SLP Speech and Language Pathologist                 OP SLP Education     Row Name 08/16/22 1045       Education    Barriers to Learning Decreased comprehension  -AL    Action Taken to Address Barriers education with mother for HEP  -AL    Education Provided Patient demonstrated recommended strategies;Family/caregivers demonstrated recommended strategies;Patient requires further education on strategies, risks;Family/caregivers require further education on strategies, risks  -AL    Assessed Learning needs;Learning motivation;Learning readiness;Learning preferences  -AL    Learning Motivation Moderate  -AL    Learning Method Explanation;Demonstration  -AL    Teaching Response Verbalized understanding;Demonstrated understanding  -AL    Education Comments Kourtney was instructed to slow down while talking to others and to make sure she is hitting all of the syllables in words.  -AL          User Key  (r) = Recorded By, (t) = Taken By, (c) = Cosigned By    Initials Name Effective Dates    Vida Tejada SLP 06/01/22 -                SLP OP Goals     Row Name 08/16/22 1045          Goal Type Needed    Goal Type Needed Other Adult Goals;Dysarthria  -AL            Subjective Comments    Subjective Comments Pt was in good spirits.  -AL            Subjective Pain    Able to rate subjective pain? yes  -AL     Pre-Treatment Pain Level 0  -AL     Post-Treatment Pain Level 0  -AL     Subjective Pain Comment \"I do not have pain\"  -AL            Dysarthria Goals     Dysarthria LTG's Patient will be able to communicate a message that is comprehensible to familiar/unfamiliar listeners utilizing verbal speech and augmentative strategies  -AL     Patient will be able to communicate a message that is comprehensible to familiar/unfamiliar listeners utilizing verbal speech and augmentative strategies 80%:  -AL     " Status: Patient will be able to communicate a message that is comprehensible to familiar/unfamiliar listeners utilizing verbal speech and augmentative strategies Progressing as expected  -AL     Comments: Patient will be able to communicate a message that is comprehensible to familiar/unfamiliar listeners utilizing verbal speech and augmentative strategies pt speech is fast and lacks articulatory precision, which affects her overall intelligibility.  She has specific lateralization with production of /s/ and /z/, which affects her intelligibility. She is also dropping syllables in multisyllabic words. She drops the middle sound. She is understood about 35% of the time in connected speech to an unfamiliar listener.  She has learned strategies to increase intelligibility including slow rate, pacing, and over articulation. She requires max cues to use strategies.  Mom indicates max cues are used at home to implement strategies. Today in speech we worked on multisyllabic words 3 syllables and 4 syllables- she produced these with 80% acc with no cues from the clinician. She produced these correctly with 100% acc with mod verbal cues to the clinician to slow down. We also worked on tongue twisters and sentence pacing. She was working hard on over articulating today.  -AL      Dysarthria STG's Patient will apply compensatory strategies to improve intelligibility of speech during in spontaneous speech  -AL     Patient will apply compensatory strategies to improve intelligibility of speech during in spontaneous speech 80%:  -AL     Status: Patient will apply compensatory strategies to improve intelligibility of speech during in spontaneous speech Progressing as expected  -AL     Comments: Patient will apply compensatory strategies to improve intelligibility of speech during in spontaneous speech Tx focused on slowing rate and pacing at the syllable or word level in connected speech.  10 functional phrases were utilized.  Kourtney usually speeds through this activity due to not liking it. However, today she said her functional phrases beautifully and nice and clear.  We also utilized some silly sentences today. During conversation, she had to be reminded to slow down multiple times.  -AL            Other Goals    Other Adult Goal- 1 Mom will encourage HEP to facilitate carryover and report back weekly on progress  -AL     Status: Other Adult Goal- 1 Progress slower than expected  -AL     Comments: Other Adult Goal- 1 Mom is encouraging use of pacing at home during the day. She has begun to notice a difference in Kourtney's speech at home.  -AL     Other Adult Goal- 2 Pt will eliminate lateralization during production of /s/ and /s/ blends at the phrase level 6/10 trials.  -AL     Status: Other Adult Goal- 2 Progressing as expected  -AL     Comments: Other Adult Goal- 2 Kourtney is using /s/ at the word level with mod cues and 65% accuracy.  -AL     Other Adult Goal- 3 In order to improve intelligibility , Kourtney will produce 3 syllable words in isolation with 80% accuracy.  -AL     Status: Other Adult Goal- 3 New  -AL     Comments: Other Adult Goal- 3 80% with mod/max cues.  -AL            SLP Time Calculation    SLP Goal Re-Cert Due Date 09/15/22  -AL           User Key  (r) = Recorded By, (t) = Taken By, (c) = Cosigned By    Initials Name Provider Type    Vida Tejada SLP Speech and Language Pathologist              Time Calculation:   SLP Start Time: 1045  SLP Stop Time: 1130  SLP Time Calculation (min): 45 min  Untimed Charges  30480-QF Treatment/ST Modification Prosth Aug Alter : 45  Total Minutes  Untimed Charges Total Minutes: 45   Total Minutes: 45    Therapy Charges for Today     Code Description Service Date Service Provider Modifiers Qty    24473220136 HC ST TREATMENT SPEECH 3 8/16/2022 Vida Clement SLP GN 1          JONO HANKS  8/16/2022

## 2022-08-23 ENCOUNTER — APPOINTMENT (OUTPATIENT)
Dept: SPEECH THERAPY | Facility: HOSPITAL | Age: 22
End: 2022-08-23

## 2022-08-30 ENCOUNTER — HOSPITAL ENCOUNTER (OUTPATIENT)
Dept: SPEECH THERAPY | Facility: HOSPITAL | Age: 22
Setting detail: THERAPIES SERIES
Discharge: HOME | End: 2022-08-30

## 2022-08-30 DIAGNOSIS — R47.1 DYSARTHRIA: Primary | ICD-10-CM

## 2022-08-30 PROCEDURE — 92507 TX SP LANG VOICE COMM INDIV: CPT

## 2022-08-30 NOTE — THERAPY TREATMENT NOTE
Outpatient Speech Language Pathology   Adult Speech Language Cognitive Treatment Note  [facility]     Patient Name: Kourtney Townsend  : 2000  MRN: [id number]  Today's Date: 2022         Visit Date: 2022   There is no problem list on file for this patient.         Visit Dx:    ICD-10-CM ICD-9-CM   1. Dysarthria  R47.1 784.51        OP SLP Assessment/Plan - 22 1056        SLP Assessment    Functional Problems Speech Language- Adult/Cognition  -AL    Impact on Function: Adult Speech Language/Cognition Difficulty communicating wants, needs, and ideas;Difficulty communicating in a medical emergency;Restrictions in personal and social life;Lack of insight or awareness of deficits, safety issues  -AL    Clinical Impression: Speech Language-Adult/Cognition Moderate-Severe:;Other (comment)   dysarthria -AL    Functional Problems Comment Kourtney has decreased intelligibility that is associated with her down syndrome dx.  Her dx of ADHD also affects her ability to utilize compensatory strategies to improve her intelligibility.  -AL    Clinical Impression Comments Kourtney arrived to speech therapy very sleepy, but was energetic by the end of the session. Kourtney’s speech is fast and lacks articulatory precision, which affects her overall intelligibility.  She has specific lateralization with production of /s/ and /z/, which affects her intelligibility. Today pt speech is fast and lacks articulatory precision, which affects her overall intelligibility.  She has specific lateralization with production of /s/ and /z/, which affects her intelligibility. She is also dropping syllables in multisyllabic words. She drops the middle sound. She is understood about 40% of the time in connected speech to an unfamiliar listener.  She has learned strategies to increase intelligibility including slow rate, pacing, and over articulation. She requires max cues to use strategies.  Mom indicates max cues are used  at home to implement strategies. Today in speech we worked on multisyllabic words 3 syllables and 4 syllables- she produced these with 90% acc with no cues from the clinician. She produced these correctly with 100% acc with mod verbal cues to the clinician to slow down. We also worked on tongue twisters and sentence pacing. She was working hard on over articulating today. Today in speech we worked on multisyllabic words 3 syllables and 4 syllables- she produced these with 80% acc with no cues from the clinician. She produced these correctly with 100% acc with mod verbal cues to the clinician to slow down. We also worked on tongue twisters and sentence pacing. She was working hard on over articulating today Tx focused on slowing rate and pacing at the syllable or word level in connected speech.  10 functional phrases were utilized. Kourtney usually speeds through this activity due to not liking it. However, today she said her functional phrases beautifully and nice and clear.  We also utilized some silly sentences today. During conversation, she had to be reminded to slow down multiple times. Mom is encouraging use of pacing at home during the day. She has begun to notice a difference in Kourtney's speech at home. Kourtney is using /s/ at the word level with mod cues and 60% accuracy.  Kourtney will benefit from speech therapy to address education and implementation of compensatory strategies to improve intelligibility.  -AL    Please refer to paper survey for additional self-reported information Yes  -AL    Please refer to items scanned into chart for additional diagnostic informaiton and handouts as provided by clinician Yes  -AL    SLP Diagnosis dysarthria  -AL    Prognosis Fair (comment)  -AL    Patient/caregiver participated in establishment of treatment plan and goals Yes  -AL    Patient would benefit from skilled therapy intervention Yes  -AL       SLP Plan    Frequency 1x per week  -AL    Duration 13 of 20   "-AL    Planned CPT's? SLP INDIVIDUAL SPEECH THERAPY: 62688  -AL    Expected Duration of Therapy Session (SLP [place]) 45  -AL    Plan Comments Continue with POC  -AL          User Key  (r) = Recorded By, (t) = Taken By, (c) = Cosigned By    Initials Name Provider Type    Vida Tejada, SLP Speech and Language Pathologist                 SLP OP Goals     Row Name 08/30/22 1056          Goal Type Needed    Goal Type Needed Dysarthria;Other Adult Goals  -AL            Subjective Comments    Subjective Comments Pt was ready for the session.  -AL            Subjective Pain    Able to rate subjective pain? yes  -AL     Pre-Treatment Pain Level 4  -AL     Post-Treatment Pain Level 4  -AL     Subjective Pain Comment \"My finger hurts from closing it in the door. I am going to go to the doctor\"  -AL            Dysarthria Goals     Dysarthria LTG's Patient will be able to communicate a message that is comprehensible to familiar/unfamiliar listeners utilizing verbal speech and augmentative strategies  -AL     Patient will be able to communicate a message that is comprehensible to familiar/unfamiliar listeners utilizing verbal speech and augmentative strategies 80%:  -AL     Status: Patient will be able to communicate a message that is comprehensible to familiar/unfamiliar listeners utilizing verbal speech and augmentative strategies Progressing as expected  -AL     Comments: Patient will be able to communicate a message that is comprehensible to familiar/unfamiliar listeners utilizing verbal speech and augmentative strategies pt speech is fast and lacks articulatory precision, which affects her overall intelligibility.  She has specific lateralization with production of /s/ and /z/, which affects her intelligibility. She is also dropping syllables in multisyllabic words. She drops the middle sound. She is understood about 40% of the time in connected speech to an unfamiliar listener.  She has learned strategies to increase " intelligibility including slow rate, pacing, and over articulation. She requires max cues to use strategies.  Mom indicates max cues are used at home to implement strategies. Today in speech we worked on multisyllabic words 3 syllables and 4 syllables- she produced these with 90% acc with no cues from the clinician. She produced these correctly with 100% acc with mod verbal cues to the clinician to slow down. We also worked on tongue twisters and sentence pacing. She was working hard on over articulating today.  -AL      Dysarthria STG's Patient will apply compensatory strategies to improve intelligibility of speech during in spontaneous speech  -AL     Patient will apply compensatory strategies to improve intelligibility of speech during in spontaneous speech 80%:  -AL     Status: Patient will apply compensatory strategies to improve intelligibility of speech during in spontaneous speech Progressing as expected  -AL     Comments: Patient will apply compensatory strategies to improve intelligibility of speech during in spontaneous speech Tx focused on slowing rate and pacing at the syllable or word level in connected speech.  10 functional phrases were utilized. Kourtney usually speeds through this activity due to not liking it. However, today she said her functional phrases beautifully and nice and clear.  We also utilized some silly sentences today. During conversation, she had to be reminded to slow down multiple times.  -AL            Other Goals    Other Adult Goal- 1 Mom will encourage HEP to facilitate carryover and report back weekly on progress  -AL     Status: Other Adult Goal- 1 Progress slower than expected  -AL     Comments: Other Adult Goal- 1 Mom is encouraging use of pacing at home during the day. She has begun to notice a difference in Kourtney's speech at home.  -AL     Other Adult Goal- 2 Pt will eliminate lateralization during production of /s/ and /s/ blends at the phrase level 6/10 trials.  -AL      Status: Other Adult Goal- 2 Progressing as expected  -AL     Comments: Other Adult Goal- 2 Kourtney is using /s/ at the word level with mod cues and 60% accuracy.  -AL     Other Adult Goal- 3 In order to improve intelligibility , Kourtney will produce 3 syllable words in isolation with 80% accuracy.  -AL     Status: Other Adult Goal- 3 New  -AL     Comments: Other Adult Goal- 3 90% with mod/max cues.  -AL            SLP Time Calculation    SLP Goal Re-Cert Due Date 09/15/22  -AL           User Key  (r) = Recorded By, (t) = Taken By, (c) = Cosigned By    Initials Name Provider Type    Vida Tejada SLP Speech and Language Pathologist               OP SLP Education     Row Name 08/30/22 1056       Education    Barriers to Learning Decreased comprehension  -AL    Action Taken to Address Barriers education with mother for HEP  -AL    Education Provided Patient demonstrated recommended strategies;Family/caregivers demonstrated recommended strategies;Patient requires further education on strategies, risks;Family/caregivers require further education on strategies, risks  -AL    Assessed Learning needs;Learning motivation;Learning readiness;Learning preferences  -AL    Learning Motivation Moderate  -AL    Learning Method Explanation;Demonstration  -AL    Teaching Response Verbalized understanding;Demonstrated understanding  -AL    Education Comments Kourtney was instructed to slow down while talking to others and to make sure she is hitting all of the syllables in words.  -AL          User Key  (r) = Recorded By, (t) = Taken By, (c) = Cosigned By    Initials Name Effective Dates    Vida Tejada SLP 06/01/22 -                      Time Calculation:   SLP Start Time: 1056  SLP Stop Time: 1145  SLP Time Calculation (min): 49 min  Untimed Charges  60970-CN Treatment/ST Modification Prosth Aug Alter : 49  Total Minutes  Untimed Charges Total Minutes: 49   Total Minutes: 49    Therapy Charges for Today     Code  Description Service Date Service Provider Modifiers Qty    [id number]  ST TREATMENT SPEECH 3 8/30/2022 Vida Clement, JONO GN 1                   JONO HANKS  8/30/2022

## 2022-09-06 ENCOUNTER — HOSPITAL ENCOUNTER (OUTPATIENT)
Dept: SPEECH THERAPY | Facility: HOSPITAL | Age: 22
Setting detail: THERAPIES SERIES
Discharge: HOME | End: 2022-09-06

## 2022-09-06 DIAGNOSIS — R47.1 DYSARTHRIA: Primary | ICD-10-CM

## 2022-09-06 PROCEDURE — 92507 TX SP LANG VOICE COMM INDIV: CPT

## 2022-09-06 NOTE — THERAPY TREATMENT NOTE
Outpatient Speech Language Pathology   Adult Speech Language Cognitive Treatment Note  [facility]     Patient Name: Kourtney Townsend  : 2000  MRN: [id number]  Today's Date: 2022         Visit Date: 2022   There is no problem list on file for this patient.         Visit Dx:    ICD-10-CM ICD-9-CM   1. Dysarthria  R47.1 784.51        OP SLP Assessment/Plan - 22 1050        SLP Assessment    Functional Problems Speech Language- Adult/Cognition  -AL    Impact on Function: Adult Speech Language/Cognition Difficulty communicating wants, needs, and ideas;Difficulty communicating in a medical emergency;Restrictions in personal and social life;Lack of insight or awareness of deficits, safety issues  -AL    Clinical Impression: Speech Language-Adult/Cognition Moderate-Severe:;Other (comment)   dysarthria -AL    Functional Problems Comment Kourtney has decreased intelligibility that is associated with her down syndrome dx.  Her dx of ADHD also affects her ability to utilize compensatory strategies to improve her intelligibility.  -AL    Clinical Impression Comments Kourtney arrived to speech therapy energetic and in a good mood. Kourtney’s speech is fast and lacks articulatory precision, which affects her overall intelligibility. Pt speech is fast and lacks articulatory precision, which affects her overall intelligibility.  She has specific lateralization with production of /s/ and /z/, which affects her intelligibility. She is also dropping syllables in multisyllabic words. She drops the middle sound. She is understood about 35% of the time in connected speech to an unfamiliar listener.  She has learned strategies to increase intelligibility including slow rate, pacing, and over articulation. She requires max cues to use strategies.  Mom indicates max cues are used at home to implement strategies. Today in speech we worked on multisyllabic words 3 syllables and 4 syllables- she produced these with  100% acc with min cues from the clinician. She produced these correctly with 100% acc with mod verbal cues to the clinician to slow down. We also worked on tongue twisters and sentence pacing. She was working hard on over articulating today. Tx focused on slowing rate and pacing at the syllable or word level in connected speech.  10 functional phrases were utilized. Kourtney easily completed this session. However, today she said her functional phrases beautifully and nice and clear and needed no cueing.  We also utilized some silly sentences today. During conversation, she had to be reminded to slow down multiple times. Kourtney is using /s/ at the word level with mod cues and 70% accuracy. Mom is encouraging use of pacing at home during the day. She has begun to notice a difference in Kourtney's speech at home. In conversation, Kourtney still talks super-fast and needs max cueing to slow down and over articulate. She needed this reminded 15X in conversation today. Kourtney will benefit from speech therapy to address education and implementation of compensatory strategies to improve intelligibility.  -AL    Please refer to paper survey for additional self-reported information Yes  -AL    Please refer to items scanned into chart for additional diagnostic informaiton and handouts as provided by clinician Yes  -AL    SLP Diagnosis dysarthria  -AL    Prognosis Fair (comment)  -AL    Patient/caregiver participated in establishment of treatment plan and goals Yes  -AL    Patient would benefit from skilled therapy intervention Yes  -AL       SLP Plan    Frequency 1x per week  -AL    Duration 14 of 20  -AL    Planned CPT's? SLP INDIVIDUAL SPEECH THERAPY: 51771  -AL    Expected Duration of Therapy Session (SLP Eval) 45  -AL    Plan Comments Continue with POC  -AL          User Key  (r) = Recorded By, (t) = Taken By, (c) = Cosigned By    Initials Name Provider Type    AL Vida Clement, SLP Speech and Language Pathologist                      SLP OP Goals     Row Name 09/06/22 1050          Goal Type Needed    Goal Type Needed Other Adult Goals;Dysarthria  -AL            Subjective Comments    Subjective Comments Pt arrived ready for the session.  -AL            Subjective Pain    Able to rate subjective pain? yes  -AL     Pre-Treatment Pain Level 0  -AL     Post-Treatment Pain Level 0  -AL     Subjective Pain Comment Pt stated that she has no pain today.  -AL            Dysarthria Goals     Dysarthria LTG's Patient will be able to communicate a message that is comprehensible to familiar/unfamiliar listeners utilizing verbal speech and augmentative strategies  -AL     Patient will be able to communicate a message that is comprehensible to familiar/unfamiliar listeners utilizing verbal speech and augmentative strategies 80%:  -AL     Status: Patient will be able to communicate a message that is comprehensible to familiar/unfamiliar listeners utilizing verbal speech and augmentative strategies Progressing as expected  -AL     Comments: Patient will be able to communicate a message that is comprehensible to familiar/unfamiliar listeners utilizing verbal speech and augmentative strategies pt speech is fast and lacks articulatory precision, which affects her overall intelligibility.  She has specific lateralization with production of /s/ and /z/, which affects her intelligibility. She is also dropping syllables in multisyllabic words. She drops the middle sound. She is understood about 35% of the time in connected speech to an unfamiliar listener.  She has learned strategies to increase intelligibility including slow rate, pacing, and over articulation. She requires max cues to use strategies.  Mom indicates max cues are used at home to implement strategies. Today in speech we worked on multisyllabic words 3 syllables and 4 syllables- she produced these with 100% acc with min cues from the clinician. She produced these correctly with 100% acc  with mod verbal cues to the clinician to slow down. We also worked on tongue twisters and sentence pacing. She was working hard on over articulating today.  -AL      Dysarthria STG's Patient will apply compensatory strategies to improve intelligibility of speech during in spontaneous speech  -AL     Patient will apply compensatory strategies to improve intelligibility of speech during in spontaneous speech 80%:  -AL     Status: Patient will apply compensatory strategies to improve intelligibility of speech during in spontaneous speech Progressing as expected  -AL     Comments: Patient will apply compensatory strategies to improve intelligibility of speech during in spontaneous speech Tx focused on slowing rate and pacing at the syllable or word level in connected speech.  10 functional phrases were utilized. Kourtney easily completed this session. However, today she said her functional phrases beautifully and nice and clear and needed no cueing.  We also utilized some silly sentences today. During conversation, she had to be reminded to slow down multiple times.  -AL            Other Goals    Other Adult Goal- 1 Mom will encourage HEP to facilitate carryover and report back weekly on progress  -AL     Status: Other Adult Goal- 1 Progress slower than expected  -AL     Comments: Other Adult Goal- 1 Mom is encouraging use of pacing at home during the day. She has begun to notice a difference in Kourtney's speech at home. In conversation Kourtney still talks super fast and needs max cueing to slow down and over articulate. She needed this reminded 15X in conversation today.  -AL     Other Adult Goal- 2 Pt will eliminate lateralization during production of /s/ and /s/ blends at the phrase level 6/10 trials.  -AL     Status: Other Adult Goal- 2 Progressing as expected  -AL     Comments: Other Adult Goal- 2 Kourtney is using /s/ at the word level with mod cues and 70% accuracy.  -AL     Other Adult Goal- 3 In order to  improve intellgibility , Kourtney will produce 3 syllable words in isolation with 80% accuracy.  -AL     Status: Other Adult Goal- 3 New  -AL     Comments: Other Adult Goal- 3 100% with min cues.  -AL            SLP Time Calculation    SLP Goal Re-Cert Due Date 09/15/22  -AL           User Key  (r) = Recorded By, (t) = Taken By, (c) = Cosigned By    Initials Name Provider Type    Vida Tejada SLP Speech and Language Pathologist               OP SLP Education     Row Name 09/06/22 1050       Education    Barriers to Learning Decreased comprehension  -AL    Action Taken to Address Barriers education with mother for HEP  -AL    Education Provided Patient demonstrated recommended strategies;Family/caregivers demonstrated recommended strategies;Patient requires further education on strategies, risks;Family/caregivers require further education on strategies, risks  -AL    Assessed Learning needs;Learning motivation;Learning readiness;Learning preferences  -AL    Learning Motivation Moderate  -AL    Learning Method Explanation;Demonstration  -AL    Teaching Response Verbalized understanding;Demonstrated understanding  -AL    Education Comments Kourtney was instructed to slow down while talking to others and to make sure she is hitting all of the syllables in words.  -AL          User Key  (r) = Recorded By, (t) = Taken By, (c) = Cosigned By    Initials Name Effective Dates    Vida Tejada SLP 06/01/22 -                      Time Calculation:   SLP Start Time: 1050  SLP Stop Time: 1150  SLP Time Calculation (min): 60 min  Untimed Charges  08310-ZP Treatment/ST Modification Prosth Aug Alter : 60  Total Minutes  Untimed Charges Total Minutes: 60   Total Minutes: 60    Therapy Charges for Today     Code Description Service Date Service Provider Modifiers Qty    [id number]  ST TREATMENT SPEECH 4 9/6/2022 Vida Clement SLP GN 1                   JONO HANKS  9/6/2022

## 2022-09-13 ENCOUNTER — APPOINTMENT (OUTPATIENT)
Dept: SPEECH THERAPY | Facility: HOSPITAL | Age: 22
End: 2022-09-13

## 2022-09-20 ENCOUNTER — APPOINTMENT (OUTPATIENT)
Dept: SPEECH THERAPY | Facility: HOSPITAL | Age: 22
End: 2022-09-20

## 2022-09-22 ENCOUNTER — HOSPITAL ENCOUNTER (OUTPATIENT)
Dept: SPEECH THERAPY | Facility: HOSPITAL | Age: 22
Setting detail: THERAPIES SERIES
Discharge: HOME | End: 2022-09-22

## 2022-09-22 DIAGNOSIS — R47.1 DYSARTHRIA: Primary | ICD-10-CM

## 2022-09-22 PROCEDURE — 92507 TX SP LANG VOICE COMM INDIV: CPT

## 2022-09-22 NOTE — THERAPY PROGRESS REPORT/RE-CERT
Outpatient Speech Language Pathology   Adult Speech Language Cognitive 90 day Progress Note  [facility]     Patient Name: Kourtney Townsend  : 2000  MRN: [id number]  Today's Date: 2022         Visit Date: 2022   There is no problem list on file for this patient.         Visit Dx:    ICD-10-CM ICD-9-CM   1. Dysarthria  R47.1 784.51        OP SLP Assessment/Plan - 22 1055        SLP Assessment    Functional Problems Speech Language- Adult/Cognition  -AL    Impact on Function: Adult Speech Language/Cognition Difficulty communicating wants, needs, and ideas;Difficulty communicating in a medical emergency;Restrictions in personal and social life;Lack of insight or awareness of deficits, safety issues  -AL    Clinical Impression: Speech Language-Adult/Cognition Moderate-Severe:;Other (comment)   Dysarthria -AL    Functional Problems Comment Kourtney has decreased intelligibility that is associated with her down syndrome dx.  Her dx of ADHD also affects her ability to utilize compensatory strategies to improve her intelligibility.  -AL    Clinical Impression Comments Today is Kourtney’s 90 day progress note. She has made good progress with skilled ST. She still is very unintelligible when talking in conversation. Mom stated that often they cannot understand her at home. Kourtney arrived to speech therapy energetic and in a good mood. Kourtney’s speech is fast and lacks articulatory precision, which affects her overall intelligibility. Pt speech is fast and lacks articulatory precision, which affects her overall intelligibility.  She has specific lateralization with production of /s/ and /z/, which affects her intelligibility. She is also dropping syllables in multisyllabic words. She drops the middle sound. She is understood about 40% of the time in connected speech to an unfamiliar listener.  She has learned strategies to increase intelligibility including slow rate, pacing, and over  articulation. She requires max cues to use strategies.  Mom indicates max cues are used at home to implement strategies. Today in speech we worked on multisyllabic words 3 syllables and 4 syllables- she produced these with 100% acc with min cues from the clinician. She produced these correctly with 100% acc with mod verbal cues to the clinician to slow down. We also worked on tongue twisters and sentence pacing. She was working hard on over articulating today. Tx focused on slowing rate and pacing at the syllable or word level in connected speech.  10 functional phrases were utilized. Kourtney easily completed this session. However, today she said her functional phrases beautifully and nice and clear and needed no cueing.  We also utilized some silly sentences today. During conversation, she had to be reminded to slow down multiple times. Kourtney is using /s/ at the word level with mod cues and 70% accuracy. Mom is encouraging use of pacing at home during the day. She has begun to notice a difference in Kourtney's speech at home. In conversation, Kourtney still talks super-fast and needs max cueing to slow down and over articulate. She needed this reminded 30X in conversation today. She even has a list of strategies in front of her. The clinician is utilizing different strategies each week to try to improve Kourtney's intelligibility.  Kourtney will benefit from speech therapy to address education and implementation of compensatory strategies to improve intelligibility.  -AL    Please refer to paper survey for additional self-reported information Yes  -AL    Please refer to items scanned into chart for additional diagnostic informaiton and handouts as provided by clinician Yes  -AL    SLP Diagnosis dysarthria  -AL    Prognosis Fair (comment)  -AL    Patient/caregiver participated in establishment of treatment plan and goals Yes  -AL    Patient would benefit from skilled therapy intervention Yes  -AL       SLP Plan  "   Frequency 1x per week  -AL    Duration 15 of 20  -AL    Planned CPT's? SLP INDIVIDUAL SPEECH THERAPY: 78441  -AL    Expected Duration of Therapy Session (SLP [place]) 45  -AL    Plan Comments Continue with POC  -AL          User Key  (r) = Recorded By, (t) = Taken By, (c) = Cosigned By    Initials Name Provider Type    Vida Tejada, SLP Speech and Language Pathologist                 SLP OP Goals     Row Name 09/22/22 2119          Goal Type Needed    Goal Type Needed Dysarthria;Other Adult Goals  -AL            Subjective Comments    Subjective Comments Pt was ready for the session today.  -AL            Subjective Pain    Able to rate subjective pain? yes  -AL     Pre-Treatment Pain Level 0  -AL     Post-Treatment Pain Level 0  -AL     Subjective Pain Comment \"I have no pain\"  -AL            Dysarthria Goals     Dysarthria LTG's Patient will be able to communicate a message that is comprehensible to familiar/unfamiliar listeners utilizing verbal speech and augmentative strategies  -AL     Patient will be able to communicate a message that is comprehensible to familiar/unfamiliar listeners utilizing verbal speech and augmentative strategies 80%:  -AL     Status: Patient will be able to communicate a message that is comprehensible to familiar/unfamiliar listeners utilizing verbal speech and augmentative strategies Progressing as expected  -AL     Comments: Patient will be able to communicate a message that is comprehensible to familiar/unfamiliar listeners utilizing verbal speech and augmentative strategies pt speech is fast and lacks articulatory precision, which affects her overall intelligibility.  She has specific lateralization with production of /s/ and /z/, which affects her intelligibility. She is also dropping syllables in multisyllabic words. She drops the middle sound. She is understood about 40% of the time in connected speech to an unfamiliar listener.  She has learned strategies to increase " intelligibility including slow rate, pacing, and over articulation. She requires max cues to use strategies.  Mom indicates max cues are used at home to implement strategies. Today in speech we worked on multisyllabic words 3 syllables and 4 syllables- she produced these with 100% acc with min cues from the clinician. She produced these correctly with 100% acc with mod verbal cues to the clinician to slow down. We also worked on tongue twisters and sentence pacing. She was working hard on over articulating today.  -AL      Dysarthria STG's Patient will apply compensatory strategies to improve intelligibility of speech during in spontaneous speech  -AL     Patient will apply compensatory strategies to improve intelligibility of speech during in spontaneous speech 80%:  -AL     Status: Patient will apply compensatory strategies to improve intelligibility of speech during in spontaneous speech Progressing as expected  -AL     Comments: Patient will apply compensatory strategies to improve intelligibility of speech during in spontaneous speech Tx focused on slowing rate and pacing at the syllable or word level in connected speech.  10 functional phrases were utilized. Kourtney easily completed this session. However, today she said her functional phrases beautifully and nice and clear and needed no cueing.  We also utilized some silly sentences today. During conversation, she had to be reminded to slow down multiple times.  -AL            Other Goals    Other Adult Goal- 1 Mom will encourage HEP to facilitate carryover and report back weekly on progress  -AL     Status: Other Adult Goal- 1 Progress slower than expected  -AL     Comments: Other Adult Goal- 1 Mom is encouraging use of pacing at home during the day. She has begun to notice a difference in Kourtney's speech at home. In conversation Kourtney still talks super fast and needs max cueing to slow down and over articulate.She needed this reminded 30X in  conversation today.  -AL     Other Adult Goal- 2 Pt will eliminate lateralization during production of /s/ and /s/ blends at the phrase level 6/10 trials.  -AL     Status: Other Adult Goal- 2 Progressing as expected  -AL     Comments: Other Adult Goal- 2 Kourtney is using /s/ at the word level with mod cues and 70% accuracy.  -AL     Other Adult Goal- 3 In order to improve intellgibility , Kourtney will produce 3 syllable words in isolation with 80% accuracy.  -AL     Status: Other Adult Goal- 3 Progressing as expected  -AL     Comments: Other Adult Goal- 3 100% with min cues.  -AL            SLP Time Calculation    SLP Goal Re-Cert Due Date 10/22/22  -AL           User Key  (r) = Recorded By, (t) = Taken By, (c) = Cosigned By    Initials Name Provider Type    Vida Teajda SLP Speech and Language Pathologist               OP SLP Education     Row Name 09/22/22 1055       Education    Barriers to Learning Decreased comprehension  -AL    Action Taken to Address Barriers education with mother for HEP  -AL    Education Provided Patient demonstrated recommended strategies;Family/caregivers demonstrated recommended strategies;Patient requires further education on strategies, risks;Family/caregivers require further education on strategies, risks  -AL    Assessed Learning needs;Learning motivation;Learning readiness;Learning preferences  -AL    Learning Motivation Moderate  -AL    Learning Method Explanation;Demonstration  -AL    Teaching Response Verbalized understanding;Demonstrated understanding  -AL    Education Comments Kourtney was instructed to slow down while talking to others and to make sure she is hitting all of the syllables in words.  -AL          User Key  (r) = Recorded By, (t) = Taken By, (c) = Cosigned By    Initials Name Effective Dates    Vida Tejada SLP 09/22/22 -                      Time Calculation:   SLP Start Time: 1055  SLP Stop Time: 1148  SLP Time Calculation (min): 53 min  Untimed  Charges  45862-SH Treatment/ST Modification Prosth Aug Alter : 53  Total Minutes  Untimed Charges Total Minutes: 53   Total Minutes: 53    Therapy Charges for Today     Code Description Service Date Service Provider Modifiers Qty    [id number]  ST TREATMENT SPEECH 4 9/22/2022 Vida Clement, SLP GN 1                   JONO Keith  9/22/2022

## 2022-09-27 ENCOUNTER — APPOINTMENT (OUTPATIENT)
Dept: SPEECH THERAPY | Facility: HOSPITAL | Age: 22
End: 2022-09-27

## 2022-10-04 ENCOUNTER — HOSPITAL ENCOUNTER (OUTPATIENT)
Dept: SPEECH THERAPY | Facility: HOSPITAL | Age: 22
Setting detail: THERAPIES SERIES
Discharge: HOME | End: 2022-10-04

## 2022-10-04 DIAGNOSIS — R47.1 DYSARTHRIA: Primary | ICD-10-CM

## 2022-10-04 PROCEDURE — 92507 TX SP LANG VOICE COMM INDIV: CPT

## 2022-10-04 NOTE — THERAPY TREATMENT NOTE
Outpatient Speech Language Pathology   Peds Speech Language Treatment Note  [facility]     Patient Name: Kourtney Townsend  : 2000  MRN: [id number]  Today's Date: 10/4/2022      Visit Date: 10/04/2022    There is no problem list on file for this patient.      Visit Dx:    ICD-10-CM ICD-9-CM   1. Dysarthria  R47.1 784.51        OP SLP Assessment/Plan - 10/04/22 1054        SLP Assessment    Functional Problems Speech Language- Adult/Cognition  -AL    Impact on Function: Adult Speech Language/Cognition Difficulty communicating wants, needs, and ideas;Difficulty communicating in a medical emergency;Restrictions in personal and social life;Lack of insight or awareness of deficits, safety issues  -AL    Clinical Impression: Speech Language-Adult/Cognition Moderate-Severe:;Other (comment)   Dysarthria -AL    Functional Problems Comment Kourtney has decreased intelligibility that is associated with her down syndrome dx.  Her dx of ADHD also affects her ability to utilize compensatory strategies to improve her intelligibility.  -AL    Clinical Impression Comments Kourtney still is very unintelligible when talking in conversation. Mom stated that often they cannot understand her at home. Kourtney arrived to speech therapy energetic and in a good mood. Kourtney’s speech is fast and lacks articulatory precision, which affects her overall intelligibility. Pt speech is fast and lacks articulatory precision, which affects her overall intelligibility.  She has specific lateralization with production of /s/ and /z/, which affects her intelligibility. She is also dropping syllables in multisyllabic words. She drops the middle sound. She is understood about 35% of the time in connected speech to an unfamiliar listener.  She has learned strategies to increase intelligibility including slow rate, pacing, and over articulation. She requires max cues to use strategies.  Mom indicates max cues are used at home to implement  strategies. Today in speech we worked on multisyllabic words 3 syllables and 4 syllables- she produced these with 70% acc with min cues from the clinician. She produced these correctly with 70% acc with mod verbal cues to the clinician to slow down. We also worked on tongue twisters and sentence pacing. She was working hard on over articulating today. Tx focused on slowing rate and pacing at the syllable or word level in connected speech.  10 functional phrases were utilized. Kourtney easily completed this session. However, today she said her functional phrases beautifully and nice and clear and needed no cueing.  We also utilized some silly sentences today. During conversation, she had to be reminded to slow down multiple times. Kourtney is using /s/ at the word level with mod cues and 60% accuracy. Mom is encouraging use of pacing at home during the day. She has begun to notice a difference in Kourtney's speech at home. In conversation, Kourtney still talks super-fast and needs max cueing to slow down and over articulate. She needed this reminded 25X in conversation today. She even has a list of strategies in front of her. Kourtney will benefit from speech therapy to address education and implementation of compensatory strategies to improve intelligibility.  -AL    Please refer to paper survey for additional self-reported information Yes  -AL    Please refer to items scanned into chart for additional diagnostic informaiton and handouts as provided by clinician Yes  -AL    SLP Diagnosis dysarthria  -AL    Prognosis Fair (comment)  -AL    Patient/caregiver participated in establishment of treatment plan and goals Yes  -AL    Patient would benefit from skilled therapy intervention Yes  -AL       SLP Plan    Frequency 1x per week  -AL    Duration 16 of 20  -AL    Planned CPT's? SLP INDIVIDUAL SPEECH THERAPY: 16316  -AL    Expected Duration of Therapy Session (SLP Eval) 45  -AL    Plan Comments Continue with POC  -AL  "         User Key  (r) = Recorded By, (t) = Taken By, (c) = Cosigned By    Initials Name Provider Type    Vida Tejada, SLP Speech and Language Pathologist                   SLP OP Goals     Row Name 10/04/22 1054          Goal Type Needed    Goal Type Needed Other Adult Goals;Dysarthria  -AL            Subjective Comments    Subjective Comments Pt was in good spirits.  -AL            Subjective Pain    Able to rate subjective pain? yes  -AL     Pre-Treatment Pain Level 0  -AL     Post-Treatment Pain Level 0  -AL     Subjective Pain Comment \"I have no pain today.\"  -AL            Dysarthria Goals     Dysarthria LTG's Patient will be able to communicate a message that is comprehensible to familiar/unfamiliar listeners utilizing verbal speech and augmentative strategies  -AL     Patient will be able to communicate a message that is comprehensible to familiar/unfamiliar listeners utilizing verbal speech and augmentative strategies 80%:  -AL     Status: Patient will be able to communicate a message that is comprehensible to familiar/unfamiliar listeners utilizing verbal speech and augmentative strategies Progressing as expected  -AL     Comments: Patient will be able to communicate a message that is comprehensible to familiar/unfamiliar listeners utilizing verbal speech and augmentative strategies pt speech is fast and lacks articulatory precision, which affects her overall intelligibility.  She has specific lateralization with production of /s/ and /z/, which affects her intelligibility. She is also dropping syllables in multisyllabic words. She drops the middle sound. She is understood about 40% of the time in connected speech to an unfamiliar listener.  She has learned strategies to increase intelligibility including slow rate, pacing, and over articulation. She requires max cues to use strategies.  Mom indicates max cues are used at home to implement strategies. Today in speech we worked on multisyllabic words " 3 syllables and 4 syllables- she produced these with 100% acc with min cues from the clinician. She produced these correctly with 100% acc with mod verbal cues to the clinician to slow down. We also worked on tongue twisters and sentence pacing. She was working hard on over articulating today.  -AL      Dysarthria STG's Patient will apply compensatory strategies to improve intelligibility of speech during in spontaneous speech  -AL     Patient will apply compensatory strategies to improve intelligibility of speech during in spontaneous speech 80%:  -AL     Status: Patient will apply compensatory strategies to improve intelligibility of speech during in spontaneous speech Progressing as expected  -AL     Comments: Patient will apply compensatory strategies to improve intelligibility of speech during in spontaneous speech Tx focused on slowing rate and pacing at the syllable or word level in connected speech.  10 functional phrases were utilized. Kourtney easily completed this session. However, today she said her functional phrases beautifully and nice and clear and needed no cueing.  We also utilized some silly sentences today. During conversation, she had to be reminded to slow down multiple times.  -AL            Other Goals    Other Adult Goal- 1 Mom will encourage HEP to facilitate carryover and report back weekly on progress  -AL     Status: Other Adult Goal- 1 Progress slower than expected  -AL     Comments: Other Adult Goal- 1 Mom is encouraging use of pacing at home during the day. She has begun to notice a difference in Kourtney's speech at home. In conversation, Kourtney still talks super-fast and needs max cueing to slow down and over articulate. She needed this reminded 25X in conversation today. She even has a list of strategies in front of her.  -AL     Other Adult Goal- 2 Pt will eliminate lateralization during production of /s/ and /s/ blends at the phrase level 6/10 trials.  -AL     Status: Other  Adult Goal- 2 Progressing as expected  -AL     Comments: Other Adult Goal- 2 . Kourtney is using /s/ at the word level with mod cues and 60% accuracy.  -AL     Other Adult Goal- 3 In order to improve intellgibility , Kourtney will produce 3 syllable words in isolation with 80% accuracy.  -AL     Status: Other Adult Goal- 3 Progressing as expected  -AL     Comments: Other Adult Goal- 3 Today in speech we worked on multisyllabic words 3 syllables and 4 syllables- she produced these with 70% acc with min cues from the clinician. She produced these correctly with 70% acc with mod verbal cues to the clinician to slow down  -AL            SLP Time Calculation    SLP Goal Re-Cert Due Date 10/22/22  -AL           User Key  (r) = Recorded By, (t) = Taken By, (c) = Cosigned By    Initials Name Provider Type    Vida Tejada SLP Speech and Language Pathologist               OP SLP Education     Row Name 10/04/22 1054       Education    Barriers to Learning Decreased comprehension  -AL    Action Taken to Address Barriers education with mother for HEP  -AL    Education Provided Patient demonstrated recommended strategies;Family/caregivers demonstrated recommended strategies;Patient requires further education on strategies, risks;Family/caregivers require further education on strategies, risks  -AL    Assessed Learning needs;Learning motivation;Learning readiness;Learning preferences  -AL    Learning Motivation Moderate  -AL    Learning Method Explanation;Demonstration  -AL    Teaching Response Verbalized understanding;Demonstrated understanding  -AL    Education Comments Kourtney was instructed to slow down while talking to others and to make sure she is hitting all of the syllables in words.  -AL          User Key  (r) = Recorded By, (t) = Taken By, (c) = Cosigned By    Initials Name Effective Dates    Vida Tejada SLP 09/22/22 -                    Time Calculation:   SLP Start Time: 1054  SLP Stop Time: 1147  SLP  Time Calculation (min): 53 min  Untimed Charges  19374-IR Treatment/ST Modification Prosth Aug Alter : 53  Total Minutes  Untimed Charges Total Minutes: 53   Total Minutes: 53    Therapy Charges for Today     Code Description Service Date Service Provider Modifiers Qty    [id number]  ST TREATMENT SPEECH 3 10/4/2022 Vida Clement, SLP GN 1                     JONO Keith  10/4/2022

## 2022-10-11 ENCOUNTER — HOSPITAL ENCOUNTER (OUTPATIENT)
Dept: SPEECH THERAPY | Facility: HOSPITAL | Age: 22
Setting detail: THERAPIES SERIES
Discharge: HOME | End: 2022-10-11

## 2022-10-11 DIAGNOSIS — R47.1 DYSARTHRIA: Primary | ICD-10-CM

## 2022-10-11 PROCEDURE — 92507 TX SP LANG VOICE COMM INDIV: CPT

## 2022-10-11 NOTE — THERAPY TREATMENT NOTE
Outpatient Speech Language Pathology   Adult Speech Language Cognitive Treatment Note  [facility]     Patient Name: Kourtney Townsend  : 2000  MRN: [id number]  Today's Date: 10/11/2022         Visit Date: 10/11/2022   There is no problem list on file for this patient.         Visit Dx:    ICD-10-CM ICD-9-CM   1. Dysarthria  R47.1 784.51        OP SLP Assessment/Plan - 10/11/22 1058        SLP Assessment    Functional Problems Speech Language- Adult/Cognition  -AL    Impact on Function: Adult Speech Language/Cognition Difficulty communicating wants, needs, and ideas;Difficulty communicating in a medical emergency;Restrictions in personal and social life;Lack of insight or awareness of deficits, safety issues  -AL    Clinical Impression: Speech Language-Adult/Cognition Moderate-Severe:;Other (comment)   Dysarthria -AL    Functional Problems Comment Kourtney has decreased intelligibility that is associated with her down syndrome dx.  Her dx of ADHD also affects her ability to utilize compensatory strategies to improve her intelligibility.  -AL    Clinical Impression Comments Kourtney still is very unintelligible when talking in conversation. Mom stated that often they cannot understand her at home. Kourtney arrived to speech therapy energetic and in a good mood. Kourtney’s speech is fast and lacks articulatory precision, which affects her overall intelligibility. Pt speech is fast and lacks articulatory precision, which affects her overall intelligibility.  She has specific lateralization with production of /s/ and /z/, which affects her intelligibility. She is also dropping syllables in multisyllabic words. She drops the middle sound. She is understood about 40% of the time in connected speech to an unfamiliar listener.  She has learned strategies to increase intelligibility including slow rate, pacing, and over articulation. She requires max cues to use strategies.  Mom indicates max cues are used at  home to implement strategies. Today in speech we worked on multisyllabic words 3 syllables and 4 syllables- she produced these with 60% acc with min cues from the clinician. She produced these correctly with 80% acc with mod verbal cues to the clinician to slow down. We also worked on tongue twisters and sentence pacing. She was working hard on over articulating today. Tx focused on slowing rate and pacing at the syllable or word level in connected speech.  10 functional phrases were utilized. Kourtney easily completed this session. However, today she said her functional phrases beautifully and nice and clear and needed no cueing.  We also utilized some silly sentences today. During conversation, she had to be reminded to slow down multiple times. Kourtney is using /s/ at the word level with mod cues and 55% accuracy. Mom is encouraging use of pacing at home during the day. She has begun to notice a difference in Kourtney's speech at home. In conversation, Kourtney still talks super-fast and needs max cueing to slow down and over articulate. She needed this reminded 25X in conversation today. She even has a list of strategies in front of her. Today Kourtney tried out some oral motor exercises to strengthen her tongue.  Kourtney correctly utilized these exercises 4X in the session. Kourtney will benefit from speech therapy to address education and implementation of compensatory strategies to improve intelligibility.  -AL    Please refer to paper survey for additional self-reported information Yes  -AL    Please refer to items scanned into chart for additional diagnostic informaiton and handouts as provided by clinician Yes  -AL    SLP Diagnosis dysarthria  -AL    Prognosis Fair (comment)  -AL    Patient/caregiver participated in establishment of treatment plan and goals Yes  -AL    Patient would benefit from skilled therapy intervention Yes  -AL       SLP Plan    Frequency 1x per week  -AL    Duration 17/20  -AL     "Planned CPT's? SLP INDIVIDUAL SPEECH THERAPY: 30018  -AL    Expected Duration of Therapy Session (SLP Eval) 45  -AL    Plan Comments Continue with POC  -AL          User Key  (r) = Recorded By, (t) = Taken By, (c) = Cosigned By    Initials Name Provider Type    Vida Tejada, SLP Speech and Language Pathologist                   SLP OP Goals     Row Name 10/11/22 1058          Goal Type Needed    Goal Type Needed Dysarthria;Other Adult Goals  -AL        Subjective Comments    Subjective Comments Pt was in good spirits today.  -AL        Subjective Pain    Able to rate subjective pain? yes  -AL     Pre-Treatment Pain Level 0  -AL     Post-Treatment Pain Level 0  -AL     Subjective Pain Comment \"I have no pain\"  -AL        Dysarthria Goals     Dysarthria LTG's Patient will be able to communicate a message that is comprehensible to familiar/unfamiliar listeners utilizing verbal speech and augmentative strategies  -AL     Patient will be able to communicate a message that is comprehensible to familiar/unfamiliar listeners utilizing verbal speech and augmentative strategies 80%:  -AL     Status: Patient will be able to communicate a message that is comprehensible to familiar/unfamiliar listeners utilizing verbal speech and augmentative strategies Progressing as expected  -AL     Comments: Patient will be able to communicate a message that is comprehensible to familiar/unfamiliar listeners utilizing verbal speech and augmentative strategies Kourtney still is very unintelligible when talking in conversation. Mom stated that often they cannot understand her at home. Kourtney arrived to speech therapy energetic and in a good mood. Kourtney’s speech is fast and lacks articulatory precision, which affects her overall intelligibility. Pt speech is fast and lacks articulatory precision, which affects her overall intelligibility.  She has specific lateralization with production of /s/ and /z/, which affects her " intelligibility. She is also dropping syllables in multisyllabic words. She drops the middle sound. She is understood about 40% of the time in connected speech to an unfamiliar listener.  She has learned strategies to increase intelligibility including slow rate, pacing, and over articulation. She requires max cues to use strategies.  Mom indicates max cues are used at home to implement strategies. . Today Kourtney tried out some oral motor exercises to strengthen her tongue.  Kourtney correctly utilized these exercises 4X in the session.  -AL      Dysarthria STG's Patient will apply compensatory strategies to improve intelligibility of speech during in spontaneous speech  -AL     Patient will apply compensatory strategies to improve intelligibility of speech during in spontaneous speech 80%:  -AL     Status: Patient will apply compensatory strategies to improve intelligibility of speech during in spontaneous speech Progressing as expected  -AL     Comments: Patient will apply compensatory strategies to improve intelligibility of speech during in spontaneous speech Tx focused on slowing rate and pacing at the syllable or word level in connected speech.  10 functional phrases were utilized. Kourtney easily completed this session. However, today she said her functional phrases beautifully and nice and clear and needed no cueing.  We also utilized some silly sentences today. During conversation, she had to be reminded to slow down multiple times.  -AL        Other Goals    Other Adult Goal- 1 Mom will encourage HEP to facilitate carryover and report back weekly on progress  -AL     Status: Other Adult Goal- 1 Progress slower than expected  -AL     Comments: Other Adult Goal- 1 Mom is encouraging use of pacing at home during the day. She has begun to notice a difference in Stephanies speech at home. In conversation, Kourtney still talks super-fast and needs max cueing to slow down and over articulate. She needed this  reminded 25X in conversation today. She even has a list of strategies in front of her.  -AL     Other Adult Goal- 2 Pt will eliminate lateralization during production of /s/ and /s/ blends at the phrase level 6/10 trials.  -AL     Status: Other Adult Goal- 2 Progressing as expected  -AL     Comments: Other Adult Goal- 2 . Kourtney is using /s/ at the word level with mod cues and 55% accuracy.  -AL     Other Adult Goal- 3 In order to improve intellgibility , Kourtney will produce 3 syllable words in isolation with 80% accuracy.  -AL     Status: Other Adult Goal- 3 Progressing as expected  -AL     Comments: Other Adult Goal- 3 Today in speech we worked on multisyllabic words 3 syllables and 4 syllables- she produced these with 60% acc with min cues from the clinician. She produced these correctly with 80% acc with mod verbal cues to the clinician to slow down. We also worked on tongue twisters and sentence pacing. She was working hard on over articulating today.  -AL        SLP Time Calculation    SLP Goal Re-Cert Due Date 10/22/22  -AL           User Key  (r) = Recorded By, (t) = Taken By, (c) = Cosigned By    Initials Name Provider Type    Vida Tejada, SLP Speech and Language Pathologist               OP SLP Education     Row Name 10/11/22 1058       Education    Barriers to Learning Decreased comprehension  -AL    Action Taken to Address Barriers education with mother for HEP  -AL    Education Provided Patient demonstrated recommended strategies;Family/caregivers demonstrated recommended strategies;Patient requires further education on strategies, risks;Family/caregivers require further education on strategies, risks  -AL    Assessed Learning needs;Learning motivation;Learning readiness;Learning preferences  -AL    Learning Motivation Moderate  -AL    Learning Method Explanation;Demonstration  -AL    Teaching Response Verbalized understanding;Demonstrated understanding  -AL    Education Comments Kourtney was  instructed to slow down while talking to others and to make sure she is hitting all of the syllables in words.  -AL          User Key  (r) = Recorded By, (t) = Taken By, (c) = Cosigned By    Initials Name Effective Dates    Vida Tejada SLP 09/22/22 -                      Time Calculation:   SLP Start Time: 1058  SLP Stop Time: 1151  SLP Time Calculation (min): 53 min  Untimed Charges  67313-AF Treatment/ST Modification Prosth Aug Alter : 53  Total Minutes  Untimed Charges Total Minutes: 53   Total Minutes: 53    Therapy Charges for Today     Code Description Service Date Service Provider Modifiers Qty    [id number] HC ST TREATMENT SPEECH 4 10/11/2022 Vida Clement SLP GN 1                   JONO Keith  10/11/2022

## 2022-10-25 ENCOUNTER — HOSPITAL ENCOUNTER (OUTPATIENT)
Dept: SPEECH THERAPY | Facility: HOSPITAL | Age: 22
Setting detail: THERAPIES SERIES
Discharge: HOME | End: 2022-10-25

## 2022-10-25 DIAGNOSIS — R47.1 DYSARTHRIA: Primary | ICD-10-CM

## 2022-10-25 PROCEDURE — 92523 SPEECH SOUND LANG COMPREHEN: CPT

## 2022-10-25 NOTE — THERAPY RE-EVALUATION
Outpatient Speech Language Pathology   Adult Speech Language Cognitive Progress Note and Re-Evaluation  [facility]     Patient Name: Kourtney Townsend  : 2000  MRN: [id number]  Today's Date: 10/25/2022        Visit Date: 10/25/2022   There is no problem list on file for this patient.       Past Medical History:   Diagnosis Date   • ADHD (attention deficit hyperactivity disorder)    • Alopecia    • Asthma    • Down syndrome         No past surgical history on file.      Visit Dx:    ICD-10-CM ICD-9-CM   1. Dysarthria  R47.1 784.51            OP SLP Assessment/Plan - 10/25/22 1102        SLP Assessment    Functional Problems Speech Language- Adult/Cognition  -AL    Impact on Function: Adult Speech Language/Cognition Difficulty communicating wants, needs, and ideas;Difficulty communicating in a medical emergency;Restrictions in personal and social life;Lack of insight or awareness of deficits, safety issues  -AL    Clinical Impression: Speech Language-Adult/Cognition Moderate-Severe:;Other (comment)   dysarthria -AL    Functional Problems Comment Kourtney has decreased intelligibility that is associated with her down syndrome dx.  Her dx of ADHD also affects her ability to utilize compensatory strategies to improve her intelligibility.  -AL    Clinical Impression Comments Today is Kourtney’s 90 day progress note and 6 month re-evaluation. She has made okay progress in the last 6 months. The clinician has begun to change her approach by completing oral mechanism exercises and focusing on certain articulation sounds. Kourtney still is very unintelligible when talking in conversation. Mom stated that often they cannot understand her at home. Kourtney arrived to speech therapy in a bad mood. Pt attended to testing today and her mood brightened up by the end of the session. Kourtney still is very unintelligible when talking in conversation. Mom stated that often they cannot understand her at home. Kourtney  arrived to speech therapy energetic and in a good mood. Kourtney’s tongue strength is getting stronger the past couple weeks. She also is able to move her tongue side to side now. Her lips and jaw are all WNL. Kourtney’s speech is fast and lacks articulatory precision, which affects her overall intelligibility. Pt speech is fast and lacks articulatory precision, which affects her overall intelligibility.  She has specific lateralization with production of /s/ and /z/, which affects her intelligibility. She is also dropping syllables in multisyllabic words. She drops the middle sound. She is understood about 34% of the time in connected speech to an unfamiliar listener.  She has learned strategies to increase intelligibility including slow rate, pacing, and over articulation. She requires max cues to use strategies.  Mom indicates max cues are used at home to implement strategies. . Today Kourtney tried out some oral motor exercises to strengthen her tongue.  Kourtney correctly utilized these exercises 5X in the session.  Tx focused on slowing rate and pacing at the syllable or word level in connected speech.  10 functional phrases were utilized. Kourtney easily completed this session. However, today she said her functional phrases beautifully and nice and clear and needed no cueing.  We also utilized some silly sentences today. During conversation, she had to be reminded to slow down multiple times. She completed this with 70% accuracy. She needed cueing to slow down. Mom is encouraging use of pacing at home during the day. She has begun to notice a difference in Kourtney's speech at home. In conversation, Kourtney still talks super-fast and needs max cueing to slow down and over articulate. She needed this reminded 25X in conversation today. She even has a list of strategies in front of her. Kourtney is using /s/ at the word level with mod cues and 45% accuracy. Today in speech we worked on multisyllabic words 3  syllables and 4 syllables- she produced these with 65% acc with min cues from the clinician. She produced these correctly with 80% acc with mod verbal cues to the clinician to slow down. We also worked on tongue twisters and sentence pacing. She was working hard on over articulating today. Kourtney correctly utilized these exercises 4X in the session. Kourtney will benefit from speech therapy to address education and implementation of compensatory strategies to improve intelligibility. -AL    Please refer to paper survey for additional self-reported information Yes  -AL    Please refer to items scanned into chart for additional diagnostic informaiton and handouts as provided by clinician Yes  -AL    SLP Diagnosis dysarthria  -AL    Prognosis Fair (comment)  -AL    Patient/caregiver participated in establishment of treatment plan and goals Yes  -AL    Patient would benefit from skilled therapy intervention Yes  -AL       SLP Plan    Frequency 1x per week  -AL    Duration 18/20  -AL    Planned CPT's? SLP INDIVIDUAL SPEECH THERAPY: 27560  -AL    Expected Duration of Therapy Session (SLP Eval) 45  -AL    Plan Comments Continue with POC  -AL          User Key  (r) = Recorded By, (t) = Taken By, (c) = Cosigned By    Initials Name Provider Type    Vida Tejada, SLP Speech and Language Pathologist                 SLP SLC Evaluation - 10/25/22 1102        SLP Evaluation Clinical Impressions    SLP Diagnosis moderate-severe;dysarthria;communication disorder  -AL    Rehab Potential/Prognosis guarded  -AL    SLC Criteria for Skilled Therapy Interventions Met yes  -AL    Functional Impact functional impact in social situations;difficulty communicating in an emergency;restrictions in personal and social life  -AL       SLP Treatment Clinical Impressions    Treatment Assessment (SLP) continued;dysarthria;communication disorder  -AL    Treatment Assessment Comments (SLP) Pt needs education to complete the HTP. She does not  want to complete it.  -AL    Daily Summary of Progress (SLP) progress toward functional goals is gradual  -AL    Barriers to Overall Progress (SLP) Resistant to information  -AL    Plan for Continued Treatment (SLP) continue treatment per plan of care  -AL    Care Plan Review care plan/treatment goals reviewed;evaluation/treatment results reviewed;risks/benefits reviewed;current/potential barriers reviewed;patient/other agree to care plan  -AL    Care Plan Review, Other Participant(s) mother  -AL       Recommendations    Therapy Frequency (SLP SLC) 1 day per week  -AL    Predicted Duration Therapy Intervention (Days) until discharge  -AL    Anticipated Discharge Disposition (SLP) home with assist  -AL    Communication Strategy Suggestions eliminate distractions when speaking with patient;avoid open-ended questions;other (see comments)   encourage slow rate/pacing -AL          User Key  (r) = Recorded By, (t) = Taken By, (c) = Cosigned By    Initials Name Provider Type    Vida Tejada SLP Speech and Language Pathologist                                OP SLP Education     Row Name 10/25/22 1104       Education    Barriers to Learning Decreased comprehension  -AL    Action Taken to Address Barriers education with mother for HEP  -AL    Education Provided Patient demonstrated recommended strategies;Family/caregivers demonstrated recommended strategies;Patient requires further education on strategies, risks;Family/caregivers require further education on strategies, risks  -AL    Assessed Learning needs;Learning motivation;Learning readiness;Learning preferences  -AL    Learning Motivation Moderate  -AL    Learning Method Explanation;Demonstration  -AL    Teaching Response Verbalized understanding;Demonstrated understanding  -AL    Education Comments Kourtney was instructed to slow down while talking to others and to make sure she is hitting all of the syllables in words.  -AL          User Key  (r) = Recorded By,  "(t) = Taken By, (c) = Cosigned By    Initials Name Effective Dates    GARCÍA Salazarana Vida, SLP 09/22/22 -                SLP OP Goals     Row Name 10/25/22 1109          Goal Type Needed    Goal Type Needed Other Adult Goals;Dysarthria  -AL        Subjective Comments    Subjective Comments Pt attended to testing today and her mood brightened up by the end of the session.  -AL        Subjective Pain    Able to rate subjective pain? yes  -AL     Pre-Treatment Pain Level 0  -AL     Post-Treatment Pain Level 0  -AL     Subjective Pain Comment \"I have no pain today.  -AL        Dysarthria Goals     Dysarthria LTG's Patient will be able to communicate a message that is comprehensible to familiar/unfamiliar listeners utilizing verbal speech and augmentative strategies  -AL     Patient will be able to communicate a message that is comprehensible to familiar/unfamiliar listeners utilizing verbal speech and augmentative strategies 80%:  -AL     Status: Patient will be able to communicate a message that is comprehensible to familiar/unfamiliar listeners utilizing verbal speech and augmentative strategies Progressing as expected  -AL     Comments: Patient will be able to communicate a message that is comprehensible to familiar/unfamiliar listeners utilizing verbal speech and augmentative strategies Kourtney still is very unintelligible when talking in conversation. Mom stated that often they cannot understand her at home. Kourtney arrived to speech therapy energetic and in a good mood. Kourtney’s speech is fast and lacks articulatory precision, which affects her overall intelligibility. Pt speech is fast and lacks articulatory precision, which affects her overall intelligibility.  She has specific lateralization with production of /s/ and /z/, which affects her intelligibility. She is also dropping syllables in multisyllabic words. She drops the middle sound. She is understood about 34% of the time in connected speech to an " unfamiliar listener.  She has learned strategies to increase intelligibility including slow rate, pacing, and over articulation. She requires max cues to use strategies.  Mom indicates max cues are used at home to implement strategies. . Today Kourtney tried out some oral motor exercises to strengthen her tongue.  Kourtney correctly utilized these exercises 5X in the session.  -AL      Dysarthria STG's Patient will apply compensatory strategies to improve intelligibility of speech during in spontaneous speech  -AL     Patient will apply compensatory strategies to improve intelligibility of speech during in spontaneous speech 80%:  -AL     Status: Patient will apply compensatory strategies to improve intelligibility of speech during in spontaneous speech Progressing as expected  -AL     Comments: Patient will apply compensatory strategies to improve intelligibility of speech during in spontaneous speech Tx focused on slowing rate and pacing at the syllable or word level in connected speech.  10 functional phrases were utilized. Kourtney easily completed this session. However, today she said her functional phrases beautifully and nice and clear and needed no cueing.  We also utilized some silly sentences today. During conversation, she had to be reminded to slow down multiple times. She completed this with 70% accuracy. She needed cueing to slow down.  -AL        Other Goals    Other Adult Goal- 1 Mom will encourage HEP to facilitate carryover and report back weekly on progress  -AL     Status: Other Adult Goal- 1 Progress slower than expected  -AL     Comments: Other Adult Goal- 1 Mom is encouraging use of pacing at home during the day. She has begun to notice a difference in Kourtney's speech at home. In conversation, Kourtney still talks super-fast and needs max cueing to slow down and over articulate. She needed this reminded 25X in conversation today. She even has a list of strategies in front of her.  -AL      Other Adult Goal- 2 Pt will eliminate lateralization during production of /s/ and /s/ blends at the phrase level 6/10 trials.  -AL     Status: Other Adult Goal- 2 Progressing as expected  -AL     Comments: Other Adult Goal- 2 . Kourtney is using /s/ at the word level with mod cues and 45% accuracy.  -AL     Other Adult Goal- 3 In order to improve intellgibility , Kourtney will produce 3 syllable words in isolation with 80% accuracy.  -AL     Status: Other Adult Goal- 3 Progressing as expected  -AL     Comments: Other Adult Goal- 3 Today in speech we worked on multisyllabic words 3 syllables and 4 syllables- she produced these with 65% acc with min cues from the clinician. She produced these correctly with 80% acc with mod verbal cues to the clinician to slow down. We also worked on tongue twisters and sentence pacing. She was working hard on over articulating today.  -AL        SLP Time Calculation    SLP Goal Re-Cert Due Date 11/24/22  -AL           User Key  (r) = Recorded By, (t) = Taken By, (c) = Cosigned By    Initials Name Provider Type    Vida Tejada SLP Speech and Language Pathologist                     Time Calculation:   SLP Start Time: 1102  SLP Stop Time: 1155  SLP Time Calculation (min): 53 min  Untimed Charges  13721-PY Eval Speech and Production w/ Language Minutes: 53  Total Minutes  Untimed Charges Total Minutes: 53   Total Minutes: 53    Therapy Charges for Today     Code Description Service Date Service Provider Modifiers Qty    [id number]  ST EVAL SPEECH AND PROD W LANG  4 10/25/2022 Vida Clement SLP GN 1                   JONO Keith  10/25/2022

## 2022-11-01 ENCOUNTER — APPOINTMENT (OUTPATIENT)
Dept: SPEECH THERAPY | Facility: HOSPITAL | Age: 22
End: 2022-11-01

## 2022-11-15 ENCOUNTER — HOSPITAL ENCOUNTER (OUTPATIENT)
Dept: SPEECH THERAPY | Facility: HOSPITAL | Age: 22
Setting detail: THERAPIES SERIES
Discharge: HOME | End: 2022-11-15

## 2022-11-15 DIAGNOSIS — R47.1 DYSARTHRIA: Primary | ICD-10-CM

## 2022-11-15 PROCEDURE — 92507 TX SP LANG VOICE COMM INDIV: CPT

## 2022-11-15 NOTE — THERAPY PROGRESS REPORT/RE-CERT
Outpatient Speech Language Pathology   Adult Speech Language Cognitive Progress Note  [facility]     Patient Name: Kourtney Townsend  : 2000  MRN: [id number]  Today's Date: 11/15/2022         Visit Date: 11/15/2022   There is no problem list on file for this patient.         Visit Dx:    ICD-10-CM ICD-9-CM   1. Dysarthria  R47.1 784.51        OP SLP Assessment/Plan - 11/15/22 1051        SLP Assessment    Functional Problems Speech Language- Adult/Cognition  -AL    Impact on Function: Adult Speech Language/Cognition Difficulty communicating wants, needs, and ideas;Difficulty communicating in a medical emergency;Restrictions in personal and social life;Lack of insight or awareness of deficits, safety issues  -AL    Clinical Impression: Speech Language-Adult/Congnition Moderate-Severe:;Other (comment)   dysarthria -AL    Functional Problems Comment Kourtney has decreased intelligibility that is associated with her down syndrome dx.  Her dx of ADHD also affects her ability to utilize compensatory strategies to improve her intelligibility.  -AL    Clinical Impression Comments Today is Kourtney’s 30 day progress note. Kourtney still is very unintelligible when talking in conversation. Mom stated that often they cannot understand her at home. Kourtney arrived to speech therapy energetic and in a good mood. Kourtney’s speech is fast and lacks articulatory precision, which affects her overall intelligibility. Pt speech is fast and lacks articulatory precision, which affects her overall intelligibility.  She has specific lateralization with production of /s/ and /z/, which affects her intelligibility. She is also dropping syllables in multisyllabic words. She drops the middle sound. She is understood about 30% of the time in connected speech to an unfamiliar listener.  She has learned strategies to increase intelligibility including slow rate, pacing, and over articulation. She requires max cues to use  strategies.  Mom indicates max cues are used at home to implement strategies. . Today Kourtney tried out some oral motor exercises to strengthen her tongue.  Kourtney correctly utilized these exercises 4X in the session. Tx focused on slowing rate and pacing at the syllable or word level in connected speech.  10 functional phrases were utilized. Kourtney easily completed this session. However, today she said her functional phrases beautifully and nice and clear and needed no cueing.  We also utilized some silly sentences today. During conversation, she had to be reminded to slow down multiple times. She completed this with 70% accuracy. She needed cueing to slow down. Mom is encouraging use of pacing at home during the day. She has begun to notice a difference in Kourtney's speech at home. In conversation, Kourtney still talks super-fast and needs max cueing to slow down and over articulate. She needed this reminded 15X in conversation today. She even has a list of strategies in front of her. Kourtney is using /s/ at the word level with mod cues and 40% accuracy. Today in speech we worked on multisyllabic words 3 syllables and 4 syllables- she produced these with 60% acc with min cues from the clinician. She produced these correctly with 70% acc with mod verbal cues to the clinician to slow down. We also worked on tongue twisters and sentence pacing. She was working hard on over articulating today. Kourtney will benefit from speech therapy to address education and implementation of compensatory strategies to improve intelligibility.  -AL    Please refer to paper survey for additional self-reported information Yes  -AL    Please refer to items scanned into chart for additional diagnostic informaiton and handouts as provided by clinician Yes  -AL    Prognosis Fair (comment)  -AL    Patient/caregiver participated in establishment of treatment plan and goals Yes  -AL    Patient would benefit from skilled therapy  "intervention Yes  -AL       SLP Plan    Frequency 1x per week  -AL    Duration 19/20  -AL    Planned CPT's? SLP INDIVIDUAL SPEECH THERAPY: 04864  -AL    Expected Duration of Therapy Session (SLP Chacorta) 45  -AL    Plan Comments Continue with POC  -AL          User Key  (r) = Recorded By, (t) = Taken By, (c) = Cosigned By    Initials Name Provider Type    Vida Tejada, SLP Speech and Language Pathologist                                   SLP OP Goals     Row Name  11/15/22 1051       Goal Type Needed    Goal Type Needed  Other Adult Goals;Dysarthria  -AL       Subjective Comments    Subjective Comments  Pt was in good spirit today. She brought pictures for the clinician to see.  -AL       Subjective Pain    Able to rate subjective pain?  yes  -AL    Pre-Treatment Pain Level  0  -AL    Post-Treatment Pain Level  0  -AL    Subjective Pain Comment  \"I have no pain\"  -AL       Dysarthria Goals     Dysarthria LTG's  Patient will be able to communicate a message that is comprehensible to familiar/unfamiliar listeners utilizing verbal speech and augmentative strategies  -AL    Patient will be able to communicate a message that is comprehensible to familiar/unfamiliar listeners utilizing verbal speech and augmentative strategies  80%:  -AL    Status: Patient will be able to communicate a message that is comprehensible to familiar/unfamiliar listeners utilizing verbal speech and augmentative strategies  Progressing as expected  -AL    Comments: Patient will be able to communicate a message that is comprehensible to familiar/unfamiliar listeners utilizing verbal speech and augmentative strategies  Kourtney still is very unintelligible when talking in conversation. Mom stated that often they cannot understand her at home. Kourtney arrived to speech therapy energetic and in a good mood. Kourtney’s speech is fast and lacks articulatory precision, which affects her overall intelligibility. Pt speech is fast and lacks " articulatory precision, which affects her overall intelligibility.  She has specific lateralization with production of /s/ and /z/, which affects her intelligibility. She is also dropping syllables in multisyllabic words. She drops the middle sound. She is understood about 30% of the time in connected speech to an unfamiliar listener.  She has learned strategies to increase intelligibility including slow rate, pacing, and over articulation. She requires max cues to use strategies.  Mom indicates max cues are used at home to implement strategies. . Today Kourtney tried out some oral motor exercises to strengthen her tongue.  Kourtney correctly utilized these exercises 4X in the session.  -AL     Dysarthria STG's  Patient will apply compensatory strategies to improve intelligibility of speech during in spontaneous speech  -AL    Patient will apply compensatory strategies to improve intelligibility of speech during in spontaneous speech  80%:  -AL    Status: Patient will apply compensatory strategies to improve intelligibility of speech during in spontaneous speech  Progressing as expected  -AL    Comments: Patient will apply compensatory strategies to improve intelligibility of speech during in spontaneous speech  Tx focused on slowing rate and pacing at the syllable or word level in connected speech.  10 functional phrases were utilized. Kourtney easily completed this session. However, today she said her functional phrases beautifully and nice and clear and needed no cueing.  We also utilized some silly sentences today. During conversation, she had to be reminded to slow down multiple times. She completed this with 70% accuracy. She needed cueing to slow down.  -AL       Other Goals    Other Adult Goal- 1  Mom will encourage HEP to facilitate carryover and report back weekly on progress  -AL    Status: Other Adult Goal- 1  Progress slower than expected  -AL    Comments: Other Adult Goal- 1  Mom is encouraging use of  pacing at home during the day. She has begun to notice a difference in Kourtney's speech at home. In conversation, Kourtney still talks super-fast and needs max cueing to slow down and over articulate. She needed this reminded 15X in conversation today. She even has a list of strategies in front of her.  -AL    Other Adult Goal- 2  Pt will eliminate lateralization during production of /s/ and /s/ blends at the phrase level 6/10 trials.  -AL    Status: Other Adult Goal- 2  Progressing as expected  -AL    Comments: Other Adult Goal- 2  . Kourtney is using /s/ at the word level with mod cues and 40% accuracy.  -AL    Other Adult Goal- 3  In order to improve intellgibility , Kourtney will produce 3 syllable words in isolation with 80% accuracy.  -AL    Status: Other Adult Goal- 3  Progressing as expected  -AL    Comments: Other Adult Goal- 3  Today in speech we worked on multisyllabic words 3 syllables and 4 syllables- she produced these with 60% acc with min cues from the clinician. She produced these correctly with 70% acc with mod verbal cues to the clinician to slow down. We also worked on tongue twisters and sentence pacing. She was working hard on over articulating today.  -AL       SLP Time Calculation    SLP Goal Re-Cert Due Date  12/15/22  -AL          User Key  (r) = Recorded By, (t) = Taken By, (c) = Cosigned By    Initials Name Provider Type    Vida Tejada, SLP Speech and Language Pathologist               OP SLP Education     Row Name 11/15/22 1051       Education    Barriers to Learning Decreased comprehension  -AL    Action Taken to Address Barriers education with mother for HEP  -AL    Education Provided Patient demonstrated recommended strategies;Family/caregivers demonstrated recommended strategies;Patient requires further education on strategies, risks;Family/caregivers require further education on strategies, risks  -AL    Assessed Learning needs;Learning motivation;Learning readiness;Learning  preferences  -AL    Learning Motivation Moderate  -AL    Learning Method Explanation;Demonstration  -AL    Teaching Response Verbalized understanding;Demonstrated understanding  -AL    Education Comments Kourtney was instructed to slow down while talking to others and to make sure she is hitting all of the syllables in words.  -AL          User Key  (r) = Recorded By, (t) = Taken By, (c) = Cosigned By    Initials Name Effective Dates    Vida Tejada SLP 09/22/22 -                      Time Calculation:   SLP Start Time: 1051  SLP Stop Time: 1144  SLP Time Calculation (min): 53 min  Untimed Charges  93675-NP Treatment/ST Modification Prosth Aug Alter : 53  Total Minutes  Untimed Charges Total Minutes: 53   Total Minutes: 53    Therapy Charges for Today     Code Description Service Date Service Provider Modifiers Qty    [id number]  ST TREATMENT SPEECH 4 11/15/2022 Vida Clement SLP GN 1                   JONO Keith  11/15/2022

## 2022-11-22 ENCOUNTER — APPOINTMENT (OUTPATIENT)
Dept: SPEECH THERAPY | Facility: HOSPITAL | Age: 22
End: 2022-11-22

## 2022-11-29 ENCOUNTER — HOSPITAL ENCOUNTER (OUTPATIENT)
Dept: SPEECH THERAPY | Facility: HOSPITAL | Age: 22
Setting detail: THERAPIES SERIES
Discharge: HOME | End: 2022-11-29

## 2022-11-29 DIAGNOSIS — R47.1 DYSARTHRIA: Primary | ICD-10-CM

## 2022-11-29 PROCEDURE — 92507 TX SP LANG VOICE COMM INDIV: CPT

## 2022-11-29 NOTE — THERAPY TREATMENT NOTE
Outpatient Speech Language Pathology   Adult Speech Language Cognitive Treatment Note  [facility]     Patient Name: Kourtney Townsend  : 2000  MRN: [id number]  Today's Date: 2022         Visit Date: 2022   There is no problem list on file for this patient.         Visit Dx:    ICD-10-CM ICD-9-CM   1. Dysarthria  R47.1 784.51        OP SLP Assessment/Plan - 22 1056        SLP Assessment    Functional Problems Speech Language- Adult/Cognition  -AL    Impact on Function: Adult Speech Language/Cognition Difficulty communicating wants, needs, and ideas;Difficulty communicating in a medical emergency;Restrictions in personal and social life;Lack of insight or awareness of deficits, safety issues  -AL    Clinical Impression: Speech Language-Adult/Congnition Moderate-Severe:;Other (comment)   dysarthria -AL    Functional Problems Comment Kourtney has decreased intelligibility that is associated with her down syndrome dx.  Her dx of ADHD also affects her ability to utilize compensatory strategies to improve her intelligibility.  -AL    Clinical Impression Comments Kourtney still is very unintelligible when talking in conversation. Mom stated that often they cannot understand her at home. Kourtney arrived to speech therapy energetic and in a good mood. Pt was in good spirits today. Mom stated that Kourtney has a tough time with pronouncing the word winner. We practiced this during the session. Kourtney still is very unintelligible when talking in conversation. Mom stated that often they cannot understand her at home. Kourtney arrived to speech therapy energetic and in a good mood. Kourtney’s speech is fast and lacks articulatory precision, which affects her overall intelligibility. Pt speech is fast and lacks articulatory precision, which affects her overall intelligibility.  She has specific lateralization with production of /s/ and /z/, which affects her intelligibility. She is also dropping  syllables in multisyllabic words. She drops the middle sound. She is understood about 40% of the time in connected speech to an unfamiliar listener.  She has learned strategies to increase intelligibility including slow rate, pacing, and over articulation. She requires max cues to use strategies.  Mom indicates max cues are used at home to implement strategies. . Today Kourtney tried out some oral motor exercises to strengthen her tongue.  Kourtney correctly utilized these exercises 6X in the session. Tx focused on slowing rate and pacing at the syllable or word level in connected speech.  10 functional phrases were utilized. Kourtney easily completed this session. However, today she said her functional phrases beautifully and nice and clear and needed no cueing.  We also utilized some silly sentences today. During conversation, she had to be reminded to slow down multiple times. She completed this with 60% accuracy. She needed cueing to slow down. Mom is encouraging use of pacing at home during the day. She has begun to notice a difference in Kourtney's speech at home. In conversation, Kourtney still talks super-fast and needs max cueing to slow down and over articulate. She needed this reminded 25X in conversation today. She even has a list of strategies in front of her. Today we began a new technique of the exploding /t/ technique. She was taught to say the /t/ sound 4 times and on the 5th one to hold is out as long as she could. She did this with 40% accuracy. We will continue to practice. We worked on multisyllabic words 3 syllables and 4 syllables- she produced these with 60% acc with min cues from the clinician.  -AL    Please refer to paper survey for additional self-reported information Yes  -AL    Please refer to items scanned into chart for additional diagnostic informaiton and handouts as provided by clinician Yes  -AL    Prognosis Fair (comment)  -AL    Patient/caregiver participated in establishment of  "treatment plan and goals Yes  -AL    Patient would benefit from skilled therapy intervention Yes  -AL       SLP Plan    Frequency 1x per week  -AL    Duration 20/20  -AL    Planned CPT's? SLP INDIVIDUAL SPEECH THERAPY: 68973  -AL    Expected Duration of Therapy Session (SLP Chacorta) 45  -AL    Plan Comments Continue with POC  -AL          User Key  (r) = Recorded By, (t) = Taken By, (c) = Cosigned By    Initials Name Provider Type    AL Vida Clement, SLP Speech and Language Pathologist                                   SLP OP Goals     Row Name 11/29/22 1056          Goal Type Needed    Goal Type Needed Pediatric Goals  -AL        Subjective Comments    Subjective Comments Pt was in good spirits today. Mom stated that Kourtney has a tough time with pronouncing the word winner. We practiced this during the session.  -AL        Subjective Pain    Able to rate subjective pain? yes  -AL     Pre-Treatment Pain Level 0  -AL     Post-Treatment Pain Level 0  -AL     Subjective Pain Comment \"I have no pain\"  -AL        Dysarthria Goals     Dysarthria LTG's Patient will be able to communicate a message that is comprehensible to familiar/unfamiliar listeners utilizing verbal speech and augmentative strategies  -AL     Patient will be able to communicate a message that is comprehensible to familiar/unfamiliar listeners utilizing verbal speech and augmentative strategies 80%:  -AL     Status: Patient will be able to communicate a message that is comprehensible to familiar/unfamiliar listeners utilizing verbal speech and augmentative strategies Progressing as expected  -AL     Comments: Patient will be able to communicate a message that is comprehensible to familiar/unfamiliar listeners utilizing verbal speech and augmentative strategies Kourtney still is very unintelligible when talking in conversation. Mom stated that often they cannot understand her at home. Kourtney arrived to speech therapy energetic and in a good mood. " Kourtney’s speech is fast and lacks articulatory precision, which affects her overall intelligibility. Pt speech is fast and lacks articulatory precision, which affects her overall intelligibility.  She has specific lateralization with production of /s/ and /z/, which affects her intelligibility. She is also dropping syllables in multisyllabic words. She drops the middle sound. She is understood about 40% of the time in connected speech to an unfamiliar listener.  She has learned strategies to increase intelligibility including slow rate, pacing, and over articulation. She requires max cues to use strategies.  Mom indicates max cues are used at home to implement strategies. . Today Kourtney tried out some oral motor exercises to strengthen her tongue.  Kourtney correctly utilized these exercises 6X in the session.  -AL      Dysarthria STG's Patient will apply compensatory strategies to improve intelligibility of speech during in spontaneous speech  -AL     Patient will apply compensatory strategies to improve intelligibility of speech during in spontaneous speech 80%:  -AL     Status: Patient will apply compensatory strategies to improve intelligibility of speech during in spontaneous speech Progressing as expected  -AL     Comments: Patient will apply compensatory strategies to improve intelligibility of speech during in spontaneous speech Tx focused on slowing rate and pacing at the syllable or word level in connected speech.  10 functional phrases were utilized. Kourtney easily completed this session. However, today she said her functional phrases beautifully and nice and clear and needed no cueing.  We also utilized some silly sentences today. During conversation, she had to be reminded to slow down multiple times. She completed this with 60% accuracy. She needed cueing to slow down.  -AL        Other Goals    Other Adult Goal- 1 Mom will encourage HEP to facilitate carryover and report back weekly on progress   -AL     Status: Other Adult Goal- 1 Progress slower than expected  -AL     Comments: Other Adult Goal- 1 Mom is encouraging use of pacing at home during the day. She has begun to notice a difference in Kourtney's speech at home. In conversation, Kourtney still talks super-fast and needs max cueing to slow down and over articulate. She needed this reminded 25X in conversation today. She even has a list of strategies in front of her.  -AL     Other Adult Goal- 2 Pt will eliminate lateralization during production of /s/ and /s/ blends at the phrase level 6/10 trials.  -AL     Status: Other Adult Goal- 2 Progressing as expected  -AL     Comments: Other Adult Goal- 2 Today we began a new technique of the exploding /t/ technique. She was taught to say the /t/ sound 4 times and on the 5th one to hold is out as long as she could. She did this with 40% accuracy. We will continue to practice.  -AL     Other Adult Goal- 3 In order to improve intellgibility , Kourtney will produce 3 syllable words in isolation with 80% accuracy.  -AL     Status: Other Adult Goal- 3 Progressing as expected  -AL     Comments: Other Adult Goal- 3 Today in speech we worked on multisyllabic words 3 syllables and 4 syllables- she produced these with 60% acc with min cues from the clinician. She produced these correctly with 75% acc with mod verbal cues to the clinician to slow down. We also worked on tongue twisters and sentence pacing. She was working hard on over articulating today.  -AL        SLP Time Calculation    SLP Goal Re-Cert Due Date 12/15/22  -AL           User Key  (r) = Recorded By, (t) = Taken By, (c) = Cosigned By    Initials Name Provider Type    Vida Tejada, SLP Speech and Language Pathologist               OP SLP Education     Row Name 11/29/22 1056       Education    Barriers to Learning Decreased comprehension  -AL    Action Taken to Address Barriers education with mother for HEP  -AL    Education Provided Patient  demonstrated recommended strategies;Family/caregivers demonstrated recommended strategies;Patient requires further education on strategies, risks;Family/caregivers require further education on strategies, risks  -AL    Assessed Learning needs;Learning motivation;Learning readiness;Learning preferences  -AL    Learning Motivation Moderate  -AL    Learning Method Explanation;Demonstration  -AL    Teaching Response Verbalized understanding;Demonstrated understanding  -AL    Education Comments Kourtney was instructed to slow down while talking to others and to make sure she is hitting all of the syllables in words.  -AL          User Key  (r) = Recorded By, (t) = Taken By, (c) = Cosigned By    Initials Name Effective Dates    Vida Tejada SLP 09/22/22 -                      Time Calculation:   SLP Start Time: 1056  SLP Stop Time: 1149  SLP Time Calculation (min): 53 min  Untimed Charges  [id number] Treatment/ST Modification Prosth Aug Alter : 53  Total Minutes  Untimed Charges Total Minutes: 53   Total Minutes: 53    Therapy Charges for Today     Code Description Service Date Service Provider Modifiers Qty    [id number]  ST TREATMENT SPEECH 4 11/29/2022 Vida Clement SLP GN 1                   JONO Keith  11/29/2022

## 2022-12-06 ENCOUNTER — HOSPITAL ENCOUNTER (OUTPATIENT)
Dept: SPEECH THERAPY | Facility: HOSPITAL | Age: 22
Setting detail: THERAPIES SERIES
Discharge: HOME | End: 2022-12-06
Payer: MEDICAID

## 2022-12-06 DIAGNOSIS — R47.1 DYSARTHRIA: Primary | ICD-10-CM

## 2022-12-06 PROCEDURE — 92507 TX SP LANG VOICE COMM INDIV: CPT

## 2022-12-06 NOTE — THERAPY TREATMENT NOTE
Outpatient Speech Language Pathology   Adult Speech Language Cognitive Treatment Note  [facility] [place]     Patient Name: Kourtney Townsend  : 2000  MRN: [id number]  Today's Date: 2022         Visit Date: 2022   There is no problem list on file for this patient.         Visit Dx:    ICD-10-CM ICD-9-CM   1. Dysarthria  R47.1 784.51        OP SLP Assessment/Plan - 22 0920        SLP Assessment    Functional Problems Speech Language- Adult/Cognition  -AL    Impact on Function: Adult Speech Language/Cognition Difficulty communicating wants, needs, and ideas;Difficulty communicating in a medical emergency;Restrictions in personal and social life;Lack of insight or awareness of deficits, safety issues  -AL    Clinical Impression: Speech Language-Adult/Congnition Moderate-Severe:;Other (comment)   Dysarthria -AL    Functional Problems Comment Kourtney has decreased intelligibility that is associated with her down syndrome dx.  Her dx of ADHD also affects her ability to utilize compensatory strategies to improve her intelligibility.  -AL    Clinical Impression Comments Kourtney still is very unintelligible when talking in conversation. Mom stated that often they cannot understand her at home. Kourtney arrived to speech therapy energetic and in a good mood. Pt was in good spirits today. Mom stated that Kourtney has a tough time with pronouncing the word winner. We practiced this during the session. Kourtney still is very unintelligible when talking in conversation. Mom stated that often they cannot understand her at home. Kourtney arrived to speech therapy energetic and in a good mood. Kourtney’s speech is fast and lacks articulatory precision, which affects her overall intelligibility. Pt speech is fast and lacks articulatory precision, which affects her overall intelligibility.  She has specific lateralization with production of /s/ and /z/, which affects her intelligibility. She is also dropping  syllables in multisyllabic words. She drops the middle sound. She is understood about 45% of the time in connected speech to an unfamiliar listener.  She has learned strategies to increase intelligibility including slow rate, pacing, and over articulation. She requires max cues to use strategies.  Mom indicates max cues are used at home to implement strategies. Today Kourtney tried out some oral motor exercises to strengthen her tongue.  Kourtney correctly utilized these exercises 5X in the session. Tx focused on slowing rate and pacing at the syllable or word level in connected speech.  10 functional phrases were utilized. Kourtney easily completed this session. However, today she said her functional phrases beautifully and nice and clear and needed no cueing.  We also utilized some silly sentences today. During conversation, she had to be reminded to slow down multiple times. She completed this with 65% accuracy. She needed cueing to slow down. Mom is encouraging use of pacing at home during the day. She has begun to notice a difference in Kourtney's speech at home. In conversation, Kourtney still talks super-fast and needs max cueing to slow down and over articulate. She needed this reminded 25X in conversation today. She even has a list of strategies in front of her. Today we began a new technique of the exploding /t/ technique. She was taught to say the /t/ sound 4 times and on the 5th one to hold is out as long as she could. She did this with 50% accuracy. We will continue to practice.  -AL    Please refer to paper survey for additional self-reported information Yes  -AL    Please refer to items scanned into chart for additional diagnostic informaiton and handouts as provided by clinician Yes  -AL    Prognosis Fair (comment)  -AL    Patient/caregiver participated in establishment of treatment plan and goals Yes  -AL    Patient would benefit from skilled therapy intervention Yes  -AL       SLP Plan    Frequency  "1x per week  -AL    Duration 20/20  -AL    Planned CPT's? SLP INDIVIDUAL SPEECH THERAPY: 12223  -AL    Expected Duration of Therapy Session (SLP [place]) 45  -AL    Plan Comments Continue with POC  -AL          User Key  (r) = Recorded By, (t) = Taken By, (c) = Cosigned By    Initials Name Provider Type    Vida Tejada, SLP Speech and Language Pathologist                                   SLP OP Goals     Row Name 12/06/22 0920          Goal Type Needed    Goal Type Needed Pediatric Goals  -AL        Subjective Comments    Subjective Comments Pt was in good spirits today.  -AL        Subjective Pain    Able to rate subjective pain? yes  -AL     Pre-Treatment Pain Level 0  -AL     Post-Treatment Pain Level 0  -AL     Subjective Pain Comment \"I am just tired\"  -AL        Dysarthria Goals     Dysarthria LTG's Patient will be able to communicate a message that is comprehensible to familiar/unfamiliar listeners utilizing verbal speech and augmentative strategies  -AL     Patient will be able to communicate a message that is comprehensible to familiar/unfamiliar listeners utilizing verbal speech and augmentative strategies 80%:  -AL     Status: Patient will be able to communicate a message that is comprehensible to familiar/unfamiliar listeners utilizing verbal speech and augmentative strategies Progressing as expected  -AL     Comments: Patient will be able to communicate a message that is comprehensible to familiar/unfamiliar listeners utilizing verbal speech and augmentative strategies Kourtney still is very unintelligible when talking in conversation. Mom stated that often they cannot understand her at home. Kourtney arrived to speech therapy energetic and in a good mood. Kourtney’s speech is fast and lacks articulatory precision, which affects her overall intelligibility. Pt speech is fast and lacks articulatory precision, which affects her overall intelligibility.  She has specific lateralization with production " of /s/ and /z/, which affects her intelligibility. She is also dropping syllables in multisyllabic words. She drops the middle sound. She is understood about 45% of the time in connected speech to an unfamiliar listener.  She has learned strategies to increase intelligibility including slow rate, pacing, and over articulation. She requires max cues to use strategies.  Mom indicates max cues are used at home to implement strategies. . Today Kourtney tried out some oral motor exercises to strengthen her tongue.  Kourtney correctly utilized these exercises 5X in the session.  -AL      Dysarthria STG's Patient will apply compensatory strategies to improve intelligibility of speech during in spontaneous speech  -AL     Patient will apply compensatory strategies to improve intelligibility of speech during in spontaneous speech 80%:  -AL     Status: Patient will apply compensatory strategies to improve intelligibility of speech during in spontaneous speech Progressing as expected  -AL     Comments: Patient will apply compensatory strategies to improve intelligibility of speech during in spontaneous speech Tx focused on slowing rate and pacing at the syllable or word level in connected speech.  10 functional phrases were utilized. Kourtney easily completed this session. However, today she said her functional phrases beautifully and nice and clear and needed no cueing.  We also utilized some silly sentences today. During conversation, she had to be reminded to slow down multiple times. She completed this with 65% accuracy. She needed cueing to slow down.  -AL        Other Goals    Other Adult Goal- 1 Mom will encourage HEP to facilitate carryover and report back weekly on progress  -AL     Status: Other Adult Goal- 1 Progress slower than expected  -AL     Comments: Other Adult Goal- 1 Mom is encouraging use of pacing at home during the day. She has begun to notice a difference in Stephanies speech at home. In conversation,  Kourtney still talks super-fast and needs max cueing to slow down and over articulate. She needed this reminded 25X in conversation today. She even has a list of strategies in front of her.  -AL     Other Adult Goal- 2 Pt will eliminate lateralization during production of /s/ and /s/ blends at the phrase level 6/10 trials.  -AL     Status: Other Adult Goal- 2 Progressing as expected  -AL     Comments: Other Adult Goal- 2 Today we began a new technique of the exploding /t/ technique. She was taught to say the /t/ sound 4 times and on the 5th one to hold is out as long as she could. She did this with 50% accuracy. We will continue to practice.  -AL     Other Adult Goal- 3 In order to improve intellgibility , Kourtney will produce 3 syllable words in isolation with 80% accuracy.  -AL     Status: Other Adult Goal- 3 Progressing as expected  -AL     Comments: Other Adult Goal- 3 We worked on multisyllabic words 3 syllables and 4 syllables- she produced these with 60% acc with min cues from the clinician. She produced these correctly with 70% acc with mod verbal cues to the clinician to slow down. We also worked on tongue twisters and sentence pacing. She was working hard on over articulating today.  -AL        SLP Time Calculation    SLP Goal Re-Cert Due Date 12/15/22  -AL           User Key  (r) = Recorded By, (t) = Taken By, (c) = Cosigned By    Initials Name Provider Type    Vida Tejada, SLP Speech and Language Pathologist               OP SLP Education     Row Name 12/06/22 0920       Education    Barriers to Learning Decreased comprehension  -AL    Action Taken to Address Barriers education with mother for HEP  -AL    Education Provided Patient demonstrated recommended strategies;Family/caregivers demonstrated recommended strategies;Patient requires further education on strategies, risks;Family/caregivers require further education on strategies, risks  -AL    Assessed Learning needs;Learning  motivation;Learning readiness;Learning preferences  -AL    Learning Motivation Moderate  -AL    Learning Method Explanation;Demonstration  -AL    Teaching Response Verbalized understanding;Demonstrated understanding  -AL    Education Comments Kourtney was instructed to slow down while talking to others and to make sure she is hitting all of the syllables in words.  -AL          User Key  (r) = Recorded By, (t) = Taken By, (c) = Cosigned By    Initials Name Effective Dates    Vida Tejada SLP 09/22/22 -                      Time Calculation:   SLP Start Time: 0920  SLP Stop Time: 1000  SLP Time Calculation (min): 40 min  Untimed Charges  43889-TO Treatment/ST Modification Prosth Aug Alter : 40  Total Minutes  Untimed Charges Total Minutes: 40   Total Minutes: 40    Therapy Charges for Today     Code Description Service Date Service Provider Modifiers Qty    [id number]  ST TREATMENT SPEECH 3 12/6/2022 Vida Clement SLP GN 1                   JONO Keith  12/6/2022

## 2022-12-20 ENCOUNTER — HOSPITAL ENCOUNTER (OUTPATIENT)
Dept: SPEECH THERAPY | Facility: HOSPITAL | Age: 22
Setting detail: THERAPIES SERIES
Discharge: HOME | End: 2022-12-20
Payer: MEDICAID

## 2022-12-20 DIAGNOSIS — Q90.9 DOWN SYNDROME: ICD-10-CM

## 2022-12-20 DIAGNOSIS — R47.1 DYSARTHRIA: Primary | ICD-10-CM

## 2022-12-20 PROCEDURE — 92507 TX SP LANG VOICE COMM INDIV: CPT

## 2022-12-20 NOTE — THERAPY PROGRESS REPORT/RE-CERT
Outpatient Speech Language Pathology   Adult Speech Language Cognitive Progress Note  [facility]     Patient Name: Kourtney Townsend  : 2000  MRN: [id number]  Today's Date: 2022         Visit Date: 2022   There is no problem list on file for this patient.         Visit Dx:    ICD-10-CM ICD-9-CM   1. Dysarthria  R47.1 784.51   2. Down syndrome  Q90.9 758.0        OP SLP Assessment/Plan - 22 1105        SLP Assessment    Functional Problems Speech Language- Adult/Cognition  -AL    Impact on Function: Adult Speech Language/Cognition Difficulty communicating wants, needs, and ideas;Difficulty communicating in a medical emergency;Restrictions in personal and social life;Lack of insight or awareness of deficits, safety issues  -AL    Clinical Impression: Speech Language-Adult/Congnition Moderate-Severe:;Other (comment)   Dysarthria -AL    Functional Problems Comment Kourtney has decresed intelligability that is associated with her down syndrom dx.  Her dx of ADHD also affects her ability to utilize compensatory strategies to improve her intelligability.  -AL    Clinical Impression Comments Today was Kourtney’s 30 day progress note. Kourtney still is very unintelligible when talking in conversation. Mom stated that often they cannot understand her at home. Pt was in good spirits today. Kourtney still is very unintelligible when talking in conversation. Mom stated that often they cannot understand her at home. Kourtney arrived to speech therapy energetic and in a good mood. Kourtney’s speech is fast and lacks articulatory precision, which affects her overall intelligibility. Pt speech is fast and lacks articulatory precision, which affects her overall intelligibility.  She has specific lateralization with production of /s/ and /z/, which affects her intelligibility. She is also dropping syllables in multisyllabic words. She drops the middle sound. She is understood about 45% of the time in  connected speech to an unfamiliar listener.  She has learned strategies to increase intelligibility including slow rate, pacing, and over articulation. She requires max cues to use strategies.  Mom indicates max cues are used at home to implement strategies.Today Kourtney tried out some oral motor exercises to strengthen her tongue.  Kourtney correctly utilized these exercises 4X in the session.  Tx focused on slowing rate and pacing at the syllable or word level in connected speech.  10 functional phrases were utilized. Kourtney easily completed this session. However, today she said her functional phrases beautifully and nice and clear and needed no cueing.  We also utilized some silly sentences today. During conversation, she had to be reminded to slow down multiple times. She completed this with 60% accuracy. She needed cueing to slow down. Mom is encouraging use of pacing at home during the day. She has begun to notice a difference in Kourtney's speech at home. In conversation, Kourtney still talks super-fast and needs max cueing to slow down and over articulate. She needed this reminded 15X in conversation today. She even has a list of strategies in front of her. We had began a new technique of the exploding /t/ technique. She was taught to say the /t/ sound 4 times and on the 5th one to hold is out as long as she could.  We will continue to practice. She accurately produced it 60% of the trials. We worked on multisyllabic words 3 syllables and 4 syllables- she produced these with 60% acc with min cues from the clinician. She produced these correctly with 65% acc with mod verbal cues to the clinician to slow down. We also worked on tongue twisters and sentence pacing. Kourtney will benefit from speech therapy to address education and implementation of compensatory strategies to improve intelligibility. -AL    Please refer to paper survey for additional self-reported information Yes  -AL    Please refer to items  "scanned into chart for additional diagnostic informaiton and handouts as provided by clinician Yes  -AL    Prognosis Fair (comment)  -AL    Patient/caregiver participated in establishment of treatment plan and goals Yes  -AL    Patient would benefit from skilled therapy intervention Yes  -AL       SLP Plan    Frequency 1x per week  -AL    Duration 2/4  -AL    Planned CPT's? SLP INDIVIDUAL SPEECH THERAPY: 85674  -AL    Expected Duration of Therapy Session (SLP Eval) 45  -AL    Plan Comments Continue with POC  -AL          User Key  (r) = Recorded By, (t) = Taken By, (c) = Cosigned By    Initials Name Provider Type    Vida Tejada, SLP Speech and Language Pathologist                                   SLP OP Goals     Row Name 12/20/22 3072          Goal Type Needed    Goal Type Needed Other Adult Goals;Dysarthria  -AL        Subjective Comments    Subjective Comments Pt was in good spirits despite testing today.  -AL        Subjective Pain    Able to rate subjective pain? yes  -AL     Pre-Treatment Pain Level 0  -AL     Post-Treatment Pain Level 0  -AL     Subjective Pain Comment \"I have no pain my throat just feels funny.\"  -AL        Dysarthria Goals    Patient will be able to communicate a message that is comprehensible to familiar/unfamiliar listeners utilizing verbal speech and augmentative strategies 80%:  -AL     Status: Patient will be able to communicate a message that is comprehensible to familiar/unfamiliar listeners utilizing verbal speech and augmentative strategies Progressing as expected  -AL     Comments: Patient will be able to communicate a message that is comprehensible to familiar/unfamiliar listeners utilizing verbal speech and augmentative strategies Kourtney still is very unintelligible when talking in conversation. Mom stated that often they cannot understand her at home. Kourtney arrived to speech therapy energetic and in a good mood. Kourtney’s speech is fast and lacks articulatory " precision, which affects her overall intelligibility. Pt speech is fast and lacks articulatory precision, which affects her overall intelligibility.  She has specific lateralization with production of /s/ and /z/, which affects her intelligibility. She is also dropping syllables in multisyllabic words. She drops the middle sound. She is understood about 45% of the time in connected speech to an unfamiliar listener.  She has learned strategies to increase intelligibility including slow rate, pacing, and over articulation. She requires max cues to use strategies.  Mom indicates max cues are used at home to implement strategies. . Today Kourtney tried out some oral motor exercises to strengthen her tongue.  Kourtney correctly utilized these exercises 4X in the session.  -AL     Patient will apply compensatory strategies to improve intelligibility of speech during in spontaneous speech 80%:  -AL     Status: Patient will apply compensatory strategies to improve intelligibility of speech during in spontaneous speech Progressing as expected  -AL     Comments: Patient will apply compensatory strategies to improve intelligibility of speech during in spontaneous speech Tx focused on slowing rate and pacing at the syllable or word level in connected speech.  10 functional phrases were utilized. Kourtney easily completed this session. However, today she said her functional phrases beautifully and nice and clear and needed no cueing.  We also utilized some silly sentences today. During conversation, she had to be reminded to slow down multiple times. She completed this with 60% accuracy. She needed cueing to slow down.  -AL        Other Goals    Other Adult Goal- 1 Mom will encourage HEP to facilitate carryover and report back weekly on progress  -AL     Status: Other Adult Goal- 1 Progress slower than expected  -AL     Comments: Other Adult Goal- 1 Mom is encouraging use of pacing at home during the day. She has begun to notice  a difference in Kourtney's speech at home. In conversation, Kourtney still talks super-fast and needs max cueing to slow down and over articulate. She needed this reminded 15X in conversation today. She even has a list of strategies in front of her.  -AL     Other Adult Goal- 2 Pt will eliminate laterlization during production of /s/ and /s/ blends at the phrase level 6/10 trials.  -AL     Status: Other Adult Goal- 2 Progressing as expected  -AL     Comments: Other Adult Goal- 2 We had began a new technique of the exploding /t/ technique. She was taught to say the /t/ sound 4 times and on the 5th one to hold is out as long as she could.  We will continue to practice. She accuractly produced it 60% of the trials.  -AL     Other Adult Goal- 3 In order to improve intellgibility , Kourtney will produce 3 syllable words in isolation with 80% accuracy.  -AL     Status: Other Adult Goal- 3 Progressing as expected  -AL     Comments: Other Adult Goal- 3 We worked on multisyllabic words 3 syllables and 4 syllables- she produced these with 60% acc with min cues from the clinician. She produced these correctly with 65% acc with mod verbal cues to the clinician to slow down. We also worked on tongue twisters and sentence pacing.  -AL        SLP Time Calculation    SLP Goal Re-Cert Due Date 01/19/23  -AL           User Key  (r) = Recorded By, (t) = Taken By, (c) = Cosigned By    Initials Name Provider Type    Vida Tejada, SLP Speech and Language Pathologist               OP SLP Education     Row Name 12/20/22 1105       Education    Barriers to Learning Decreased comprehension  -AL    Action Taken to Address Barriers education with mother for HEP  -AL    Education Provided Patient demonstrated recommended strategies;Family/caregivers demonstrated recommended strategies;Patient requires further education on strategies, risks;Family/caregivers require further education on strategies, risks  -AL    Assessed Learning  needs;Learning motivation;Learning readiness;Learning preferences  -AL    Learning Motivation Moderate  -AL    Learning Method Explanation;Demonstration  -AL    Teaching Response Verbalized understanding;Demonstrated understanding  -AL    Education Comments Kourtney was instructed to slow down while talking to others and to make sure she is hitting all of the syllables in words.  -AL          User Key  (r) = Recorded By, (t) = Taken By, (c) = Cosigned By    Initials Name Effective Dates    Vida Tejada SLP 09/22/22 -                      Time Calculation:   SLP Start Time: 1105  SLP Stop Time: 1158  SLP Time Calculation (min): 53 min  Untimed Charges  54856-YO Treatment/ST Modification Prosth Aug Alter : 53  Total Minutes  Untimed Charges Total Minutes: 53   Total Minutes: 53    Therapy Charges for Today     Code Description Service Date Service Provider Modifiers Qty    [id number] HC ST TREATMENT SPEECH 4 12/20/2022 Vida Clement SLP GN 1                   JONO Keith  12/20/2022

## 2022-12-27 ENCOUNTER — APPOINTMENT (OUTPATIENT)
Dept: SPEECH THERAPY | Facility: HOSPITAL | Age: 22
End: 2022-12-27
Payer: MEDICAID

## 2023-01-10 ENCOUNTER — HOSPITAL ENCOUNTER (OUTPATIENT)
Dept: SPEECH THERAPY | Facility: HOSPITAL | Age: 23
Setting detail: THERAPIES SERIES
Discharge: HOME | End: 2023-01-10
Payer: MEDICAID

## 2023-01-10 DIAGNOSIS — Q90.9 DOWN SYNDROME: ICD-10-CM

## 2023-01-10 DIAGNOSIS — R47.1 DYSARTHRIA: Primary | ICD-10-CM

## 2023-01-10 PROCEDURE — 92507 TX SP LANG VOICE COMM INDIV: CPT

## 2023-01-10 NOTE — THERAPY TREATMENT NOTE
Outpatient Speech Language Pathology   Peds Speech Language Treatment Note  [facility]     Patient Name: Kourtney Townsend  : 2000  MRN: [id number]  Today's Date: 1/10/2023      Visit Date: 01/10/2023    There is no problem list on file for this patient.      Visit Dx:    ICD-10-CM ICD-9-CM   1. Dysarthria  R47.1 784.51   2. Down syndrome  Q90.9 758.0        OP SLP Assessment/Plan - 01/10/23 1056        SLP Assessment    Functional Problems Speech Language- Adult/Cognition  -AL    Impact on Function: Adult Speech Language/Cognition Difficulty communicating wants, needs, and ideas;Difficulty communicating in a medical emergency;Restrictions in personal and social life;Lack of insight or awareness of deficits, safety issues  -AL    Clinical Impression: Speech Language-Adult/Congnition Moderate-Severe:;Other (comment)   Dysarthria -AL    Functional Problems Comment Kourtney has decreased intelligibility that is associated with her down syndrome dx.  Her dx of ADHD also affects her ability to utilize compensatory strategies to improve her intelligibility.  -AL    Clinical Impression Comments Kourtney still is very unintelligible when talking in conversation. Mom stated that often they cannot understand her at home. Kourtney arrived to speech therapy energetic and in a good mood. Pt was in good spirits today. Mom stated that Kourtney has a tough time with pronouncing the word winner. We practiced this during the session. Kourtney still is very unintelligible when talking in conversation. Mom stated that often they cannot understand her at home. Kourtney’s speech is fast and lacks articulatory precision, which affects her overall intelligibility. Pt speech is fast and lacks articulatory precision, which affects her overall intelligibility.  She has specific lateralization with production of /s/ and /z/, which affects her intelligibility. She is also dropping syllables in multisyllabic words. She drops the  middle sound. She is understood about 55% of the time in connected speech to an unfamiliar listener.  She has learned strategies to increase intelligibility including slow rate, pacing, and over articulation. She requires max cues to use strategies.  Mom indicates max cues are used at home to implement strategies. . Today Kourtney tried out some oral motor exercises to strengthen her tongue.  Kourtney correctly utilized these exercises 3X in the session. Tx focused on slowing rate and pacing at the syllable or word level in connected speech.  10 functional phrases were utilized. Kourtney easily completed this session. However, today she said her functional phrases beautifully and nice and clear and needed no cueing.  We also utilized some silly sentences today. During conversation, she had to be reminded to slow down multiple times. She completed this with 70% accuracy. She needed cueing to slow down. Mom is encouraging use of pacing at home during the day. She has begun to notice a difference in Kourtney's speech at home. In conversation, Kourtney still talks super-fast and needs max cueing to slow down and over articulate. She needed this reminded 20X in conversation today. She even has a list of strategies in front of her. We had began a new technique of the exploding /t/ technique. She was taught to say the /t/ sound 4 times and on the 5th one to hold is out as long as she could.  We will continue to practice. She accurately produced it 65% of the trials. We worked on multisyllabic words 3 syllables and 4 syllables- she produced these with 65% acc with min cues from the clinician. She produced these correctly with 70% acc with mod verbal cues to the clinician to slow down. We also worked on tongue twisters and sentence pacing. Kourtney will benefit from speech therapy to address education and implementation of compensatory strategies to improve intelligibility.  -AL    Please refer to paper survey for additional  "self-reported information Yes  -AL    Please refer to items scanned into chart for additional diagnostic informaiton and handouts as provided by clinician Yes  -AL    Prognosis Fair (comment)  -AL    Patient/caregiver participated in establishment of treatment plan and goals Yes  -AL    Patient would benefit from skilled therapy intervention Yes  -AL       SLP Plan    Frequency 1x per week  -AL    Duration 1/20  -AL    Planned CPT's? SLP INDIVIDUAL SPEECH THERAPY: 25630  -AL    Expected Duration of Therapy Session (SLP Eval) 45  -AL    Plan Comments Continue with POC  -AL          User Key  (r) = Recorded By, (t) = Taken By, (c) = Cosigned By    Initials Name Provider Type    Vida Tejada, SLP Speech and Language Pathologist                                 SLP OP Goals     Row Name 01/10/23 1056          Goal Type Needed    Goal Type Needed Other Adult Goals;Dysarthria  -AL        Subjective Comments    Subjective Comments Pt was tired and did not want to participate much.  -AL        Subjective Pain    Able to rate subjective pain? yes  -AL     Pre-Treatment Pain Level 0  -AL     Post-Treatment Pain Level 0  -AL     Subjective Pain Comment \"I am just tired.\"  -AL        Dysarthria Goals    Patient will be able to communicate a message that is comprehensible to familiar/unfamiliar listeners utilizing verbal speech and augmentative strategies 80%:  -AL     Status: Patient will be able to communicate a message that is comprehensible to familiar/unfamiliar listeners utilizing verbal speech and augmentative strategies Progressing as expected  -AL     Comments: Patient will be able to communicate a message that is comprehensible to familiar/unfamiliar listeners utilizing verbal speech and augmentative strategies Kourtney still is very unintelligible when talking in conversation. Mom stated that often they cannot understand her at home. Kourtney’s speech is fast and lacks articulatory precision, which affects her " overall intelligibility. Pt speech is fast and lacks articulatory precision, which affects her overall intelligibility.  She has specific lateralization with production of /s/ and /z/, which affects her intelligibility. She is also dropping syllables in multisyllabic words. She drops the middle sound. She is understood about 55% of the time in connected speech to an unfamiliar listener.  She has learned strategies to increase intelligibility including slow rate, pacing, and over articulation. She requires max cues to use strategies.  Mom indicates max cues are used at home to implement strategies. . Today Kourtney tried out some oral motor exercises to strengthen her tongue.  Kourtney correctly utilized these exercises 3X in the session.  -AL     Patient will apply compensatory strategies to improve intelligibility of speech during in spontaneous speech 80%:  -AL     Status: Patient will apply compensatory strategies to improve intelligibility of speech during in spontaneous speech Progressing as expected  -AL     Comments: Patient will apply compensatory strategies to improve intelligibility of speech during in spontaneous speech Tx focused on slowing rate and pacing at the syllable or word level in connected speech.  10 functional phrases were utilized. Kourtney easily completed this session. However, today she said her functional phrases beautifully and nice and clear and needed no cueing.  We also utilized some silly sentences today. During conversation, she had to be reminded to slow down multiple times. She completed this with 70% accuracy. She needed cueing to slow down.  -AL        Other Goals    Other Adult Goal- 1 Mom will encourage HEP to facilitate carryover and report back weekly on progress  -AL     Status: Other Adult Goal- 1 Progress slower than expected  -AL     Comments: Other Adult Goal- 1 Mom is encouraging use of pacing at home during the day. She has begun to notice a difference in Kourtney's  speech at home. In conversation, Kourtney still talks super-fast and needs max cueing to slow down and over articulate. She needed this reminded 20X in conversation today. She even has a list of strategies in front of her.  -AL     Other Adult Goal- 2 Pt will eliminate lateralization during production of /s/ and /s/ blends at the phrase level 6/10 trials.  -AL     Status: Other Adult Goal- 2 Progressing as expected  -AL     Comments: Other Adult Goal- 2 We had began a new technique of the exploding /t/ technique. She was taught to say the /t/ sound 4 times and on the 5th one to hold is out as long as she could.  We will continue to practice. She accurately produced it 65% of the trials.  -AL     Other Adult Goal- 3 In order to improve intellgibility , Kourtney will produce 3 syllable words in isolation with 80% accuracy.  -AL     Status: Other Adult Goal- 3 Progressing as expected  -AL     Comments: Other Adult Goal- 3 We worked on multisyllabic words 3 syllables and 4 syllables- she produced these with 65% acc with min cues from the clinician. She produced these correctly with 70% acc with mod verbal cues to the clinician to slow down. We also worked on tongue twisters and sentence pacing.  -AL        SLP Time Calculation    SLP Goal Re-Cert Due Date 01/19/23  -AL           User Key  (r) = Recorded By, (t) = Taken By, (c) = Cosigned By    Initials Name Provider Type    Vida Tejada, SLP Speech and Language Pathologist               OP SLP Education     Row Name 01/10/23 1056       Education    Barriers to Learning Decreased comprehension  -AL    Action Taken to Address Barriers education with mother for HEP  -AL    Education Provided Patient demonstrated recommended strategies;Family/caregivers demonstrated recommended strategies;Patient requires further education on strategies, risks;Family/caregivers require further education on strategies, risks  -AL    Assessed Learning needs;Learning motivation;Learning  readiness;Learning preferences  -AL    Learning Motivation Moderate  -AL    Learning Method Explanation;Demonstration  -AL    Teaching Response Verbalized understanding;Demonstrated understanding  -AL    Education Comments Kourtney was instructed to slow down while talking to others and to make sure she is hitting all of the syllables in words.  -AL          User Key  (r) = Recorded By, (t) = Taken By, (c) = Cosigned By    Initials Name Effective Dates    Vida Tejada SLP 09/22/22 -                    Time Calculation:   SLP Start Time: 1056  SLP Stop Time: 1149  SLP Time Calculation (min): 53 min  Untimed Charges  75197-VP Treatment/ST Modification Prosth Aug Alter : 53  Total Minutes  Untimed Charges Total Minutes: 53   Total Minutes: 53    Therapy Charges for Today     Code Description Service Date Service Provider Modifiers Qty    [id number]  ST TREATMENT SPEECH 4 1/10/2023 Vida Clement SLP GN 1                     JONO Keith  1/10/2023

## 2023-01-17 ENCOUNTER — HOSPITAL ENCOUNTER (OUTPATIENT)
Dept: SPEECH THERAPY | Facility: HOSPITAL | Age: 23
Setting detail: THERAPIES SERIES
Discharge: HOME | End: 2023-01-17
Payer: MEDICAID

## 2023-01-17 DIAGNOSIS — Q90.9 DOWN SYNDROME: ICD-10-CM

## 2023-01-17 DIAGNOSIS — R47.1 DYSARTHRIA: Primary | ICD-10-CM

## 2023-01-17 PROCEDURE — 92507 TX SP LANG VOICE COMM INDIV: CPT

## 2023-01-17 NOTE — THERAPY PROGRESS REPORT/RE-CERT
Outpatient Speech Language Pathology   Adult Speech Language Cognitive Progress Note  [facility]     Patient Name: Kourtney Townsend  : 2000  MRN: [id number]  Today's Date: 2023         Visit Date: 2023   There is no problem list on file for this patient.         Visit Dx:    ICD-10-CM ICD-9-CM   1. Dysarthria  R47.1 784.51   2. Down syndrome  Q90.9 758.0        OP SLP Assessment/Plan - 23 1053        SLP Assessment    Functional Problems Speech Language- Adult/Cognition  -AL    Impact on Function: Adult Speech Language/Cognition Difficulty communicating wants, needs, and ideas;Difficulty communicating in a medical emergency;Restrictions in personal and social life;Lack of insight or awareness of deficits, safety issues  -AL    Clinical Impression: Speech Language-Adult/Congnition Moderate-Severe:;Other (comment)   Dysarthria -AL    Functional Problems Comment Kourtney has decreased intelligibility that is associated with her down syndrome dx.  Her dx of ADHD also affects her ability to utilize compensatory strategies to improve her intelligibility.  -AL    Clinical Impression Comments Today is Kourtney’s 90 day progress note. Therapy to focus on building her articulation skills to increase intelligibility to continue to build the skills and foundation so unfamiliar listeners can understand her. Kourtney still is very unintelligible when talking in conversation. Mom stated that often they could’t understand her at home. Kourtney arrived to speech therapy energetic and in a good mood. Pt was in good spirits today. Mom stated that Kourtney has a tough time with pronouncing the word winner. We practiced this during the session. Kourtney still is very unintelligible when talking in conversation. Mom stated that often they cannot understand her at home. Kourtney’s speech is fast and lacks articulatory precision, which affects her overall intelligibility. Pt speech is fast and lacks  articulatory precision, which affects her overall intelligibility.  She has specific lateralization with production of /s/ and /z/, which affects her intelligibility. She is also dropping syllables in multisyllabic words. She drops the middle sound. She is understood about 65% of the time in connected speech to an unfamiliar listener.  She has learned strategies to increase intelligibility including slow rate, pacing, and over articulation. She requires max cues to use strategies.  Mom indicates max cues are used at home to implement strategies. . Today Kourtney tried out some oral motor exercises to strengthen her tongue.  Kourtney correctly utilized these exercises 4X in the session. Tx focused on slowing rate and pacing at the syllable or word level in connected speech.  10 functional phrases were utilized. Kourtney easily completed this session. However, today she said her functional phrases beautifully and nice and clear and needed no cueing.  We also utilized some silly sentences today. During conversation, she had to be reminded to slow down multiple times. She completed this with 60% accuracy. She needed cueing to slow down. Mom is encouraging use of pacing at home during the day. She has begun to notice a difference in Kourtney's speech at home. In conversation, Kourtney still talks super-fast and needs max cueing to slow down and over articulate. She needed this reminded 25X in conversation today. She even has a list of strategies in front of her. We had began the technique of the exploding /t/ technique. She was taught to say the /t/ sound 4 times and on the 5th one to hold is out as long as she could.  We will continue to practice.She accurately produced it 70% of the trials. We worked on multisyllabic words 3 syllables and 4 syllables- she produced these with 65% acc with min cues from the clinician. She produced these correctly with 70% acc with mod verbal cues to the clinician to slow down. We also  "worked on tongue twisters and sentence pacing. Kourtney will benefit from speech therapy to address education and implementation of compensatory strategies to improve intelligibility.  -AL    Please refer to paper survey for additional self-reported information Yes  -AL    Please refer to items scanned into chart for additional diagnostic informaiton and handouts as provided by clinician Yes  -AL    Prognosis Fair (comment)  -AL    Patient/caregiver participated in establishment of treatment plan and goals Yes  -AL    Patient would benefit from skilled therapy intervention Yes  -AL       SLP Plan    Frequency 1x per week  -AL    Duration 2/20  -AL    Planned CPT's? SLP INDIVIDUAL SPEECH THERAPY: 12045  -AL    Expected Duration of Therapy Session (SLP Eval) 45  -AL    Plan Comments Continue with POC  -AL          User Key  (r) = Recorded By, (t) = Taken By, (c) = Cosigned By    Initials Name Provider Type    Vida Tejada, SLP Speech and Language Pathologist                                   SLP OP Goals     Row Name 01/17/23 1053          Goal Type Needed    Goal Type Needed Other Adult Goals;Dysarthria  -AL        Subjective Comments    Subjective Comments Pt did not want to be at skilled ST today. She did work hard once we got the session started.  -AL        Subjective Pain    Able to rate subjective pain? yes  -AL     Pre-Treatment Pain Level 0  -AL     Post-Treatment Pain Level 0  -AL     Subjective Pain Comment \"I am just tired and want to go home.\"  -AL        Dysarthria Goals     Dysarthria LTG's Patient will be able to communicate a message that is comprehensible to familiar/unfamiliar listeners utilizing verbal speech and augmentative strategies  -AL     Patient will be able to communicate a message that is comprehensible to familiar/unfamiliar listeners utilizing verbal speech and augmentative strategies 80%:  -AL     Status: Patient will be able to communicate a message that is comprehensible to " familiar/unfamiliar listeners utilizing verbal speech and augmentative strategies Progressing as expected  -AL     Comments: Patient will be able to communicate a message that is comprehensible to familiar/unfamiliar listeners utilizing verbal speech and augmentative strategies Kourtney still is very unintelligible when talking in conversation. Mom stated that often they could’t understand her at home. Kourtney arrived to speech therapy energetic and in a good mood. Pt was in good spirits today. Mom stated that Kourtney has a tough time with pronouncing the word winner. We practiced this during the session. Kourtney still is very unintelligible when talking in conversation. Mom stated that often they cannot understand her at home. Kourtney’s speech is fast and lacks articulatory precision, which affects her overall intelligibility. Pt speech is fast and lacks articulatory precision, which affects her overall intelligibility.  She has specific lateralization with production of /s/ and /z/, which affects her intelligibility. She is also dropping syllables in multisyllabic words. She drops the middle sound. She is understood about 65% of the time in connected speech to an unfamiliar listener.  -AL      Dysarthria STG's Patient will apply compensatory strategies to improve intelligibility of speech during in spontaneous speech  -AL     Patient will apply compensatory strategies to improve intelligibility of speech during in spontaneous speech 80%:  -AL     Status: Patient will apply compensatory strategies to improve intelligibility of speech during in spontaneous speech Progressing as expected  -AL     Comments: Patient will apply compensatory strategies to improve intelligibility of speech during in spontaneous speech Today Kourtney tried out some oral motor exercises to strengthen her tongue.  Kourtney correctly utilized these exercises 4X in the session. Tx focused on slowing rate and pacing at the syllable or word  level in connected speech.  10 functional phrases were utilized. Kourtney easily completed this session. However, today she said her functional phrases beautifully and nice and clear and needed no cueing.  We also utilized some silly sentences today. During conversation, she had to be reminded to slow down multiple times. She completed this with 60% accuracy. She needed cueing to slow down. Mom is encouraging use of pacing at home during the day. She has begun to notice a difference in Kourtney's speech at home. In conversation, Kourtney still talks super-fast and needs max cueing to slow down and over articulate. She needed this reminded 25X in conversation today. She even has a list of strategies in front of her.  -AL        Other Goals    Other Adult Goal- 1 Mom will encourage HEP to facilitate carryover and report back weekly on progress  -AL     Status: Other Adult Goal- 1 Progress slower than expected  -AL     Comments: Other Adult Goal- 1 Mom is encouraging use of pacing at home during the day. She has begun to notice a difference in Kourtney's speech at home. In conversation, Kourtney still talks super-fast and needs max cueing to slow down and over articulate. She needed this reminded 20X in conversation today. She even has a list of strategies in front of her.  -AL     Other Adult Goal- 2 Pt will eliminate lateralization during production of /s/ and /s/ blends at the phrase level 6/10 trials.  -AL     Status: Other Adult Goal- 2 Progressing as expected  -AL     Comments: Other Adult Goal- 2 . We had began the technique of the exploding /t/ technique. She was taught to say the /t/ sound 4 times and on the 5th one to hold is out as long as she could.  We will continue to practice. She accurately produced it 70% of the trials.  -AL     Other Adult Goal- 3 In order to improve intellgibility , Kourtney will produce 3 syllable words in isolation with 80% accuracy.  -AL     Status: Other Adult Goal- 3 Progressing  as expected  -AL     Comments: Other Adult Goal- 3 We worked on multisyllabic words 3 syllables and 4 syllables- she produced these with 65% acc with min cues from the clinician. She produced these correctly with 70% acc with mod verbal cues to the clinician to slow down.  -AL        SLP Time Calculation    SLP Goal Re-Cert Due Date 02/16/23  -AL           User Key  (r) = Recorded By, (t) = Taken By, (c) = Cosigned By    Initials Name Provider Type    Vida Tejada SLP Speech and Language Pathologist               OP SLP Education     Row Name 01/17/23 1053       Education    Barriers to Learning Decreased comprehension  -AL    Action Taken to Address Barriers education with mother for HEP  -AL    Education Provided Patient demonstrated recommended strategies;Family/caregivers demonstrated recommended strategies;Patient requires further education on strategies, risks;Family/caregivers require further education on strategies, risks  -AL    Assessed Learning needs;Learning motivation;Learning readiness;Learning preferences  -AL    Learning Motivation Moderate  -AL    Learning Method Explanation;Demonstration  -AL    Teaching Response Verbalized understanding;Demonstrated understanding  -AL    Education Comments Mom was given P papers with Kourtney's strategies to be utilized at home.  -AL          User Key  (r) = Recorded By, (t) = Taken By, (c) = Cosigned By    Initials Name Effective Dates    Vida Tejada SLP 09/22/22 -                      Time Calculation:   SLP Start Time: 1053  SLP Stop Time: 1146  SLP Time Calculation (min): 53 min  Untimed Charges  08931-XV Treatment/ST Modification Prosth Aug Alter : 53  Total Minutes  Untimed Charges Total Minutes: 53   Total Minutes: 53    Therapy Charges for Today     Code Description Service Date Service Provider Modifiers Qty    [id number]  ST TREATMENT SPEECH 4 1/17/2023 Vida Clement SLP GN 1                   JONO Keith  1/17/2023

## 2023-01-24 ENCOUNTER — HOSPITAL ENCOUNTER (OUTPATIENT)
Dept: SPEECH THERAPY | Facility: HOSPITAL | Age: 23
Setting detail: THERAPIES SERIES
Discharge: HOME | End: 2023-01-24
Payer: MEDICAID

## 2023-01-24 DIAGNOSIS — Q90.9 DOWN SYNDROME: ICD-10-CM

## 2023-01-24 DIAGNOSIS — R47.1 DYSARTHRIA: Primary | ICD-10-CM

## 2023-01-24 PROCEDURE — 92507 TX SP LANG VOICE COMM INDIV: CPT

## 2023-01-24 NOTE — THERAPY TREATMENT NOTE
Outpatient Speech Language Pathology   Adult Speech Language Cognitive Treatment Note  [facility]     Patient Name: Kourtney Townsend  : 2000  MRN: [id number]  Today's Date: 2023         Visit Date: 2023   There is no problem list on file for this patient.         Visit Dx:    ICD-10-CM ICD-9-CM   1. Dysarthria  R47.1 784.51   2. Down syndrome  Q90.9 758.0        OP SLP Assessment/Plan - 23 1046        SLP Assessment    Functional Problems Speech Language- Peds  -AL    Impact on Function: Adult Speech Language/Cognition Difficulty communicating wants, needs, and ideas;Difficulty communicating in a medical emergency;Restrictions in personal and social life;Lack of insight or awareness of deficits, safety issues  -AL    Clinical Impression: Speech Language-Adult/Congnition Moderate-Severe:;Other (comment)   Dysarthria -AL    Functional Problems Comment Kourtney has decreased intelligibility that is associated with her down syndrome dx.  Her dx of ADHD also affects her ability to utilize compensatory strategies to improve her intelligibility.  -AL    Clinical Impression Comments Kourtney had a rough session today. Pt was very tired today and actively falling asleep during the session. She had dry skin on her cheeks and eye lids that she was complaining.  Therapy to focus on building her articulation skills to increase intelligibility to continue to build the skills and foundation so unfamiliar listeners can understand her.  Kourtney still is very unintelligible when talking in conversation. Grandma stated that often they couldn't’t understand her when she comes over. Grandma stated that she works with her on the homework. Kourtney still is very unintelligible when talking in conversation. Kourtney’s speech is fast and lacks articulatory precision, which affects her overall intelligibility. Pt speech is fast and lacks articulatory precision, which affects her overall intelligibility.  She  has specific lateralization with production of /s/ and /z/, which affects her intelligibility. She is also dropping syllables in multisyllabic words. She drops the middle sound. She is understood about 60% of the time in connected speech to an unfamiliar listener. Today Kourtney tried out some oral motor exercises to strengthen her tongue.  Kourtney correctly utilized these exercises 3X in the session. Tx focused on slowing rate and pacing at the syllable or word level in connected speech.  10 functional phrases were utilized. Kourtney easily completed this session. However, today she said her functional phrases beautifully and nice and clear and needed no cueing.  We also utilized some silly sentences today. During conversation, she had to be reminded to slow down multiple times. She completed this with 65% accuracy. She needed cueing to slow down. Mom is encouraging use of pacing at home during the day. She has begun to notice a difference in Kourtney's speech at home. In conversation, Kourtney still talks super-fast and needs max cueing to slow down and over articulate. She needed this reminded 45 X in conversation today. She even has a list of strategies in front of her. We are utilizing the technique of the exploding /t/ technique. She was taught to say the /t/ sound 4 times and on the 5th one to hold is out as long as she could.  We will continue to practice. She accurately produced it 75% of the trials. We worked on multisyllabic words 3 syllables and 4 syllables- she produced these with 65% acc with min cues from the clinician. She produced these correctly with 70% acc with mod verbal cues to the clinician to slow down. Kourtney will benefit from speech therapy to address education and implementation of compensatory strategies to improve intelligibility. -AL    Please refer to paper survey for additional self-reported information Yes  -AL    Please refer to items scanned into chart for additional diagnostic  "informaiton and handouts as provided by clinician Yes  -AL    Prognosis Fair (comment)  -AL    Patient/caregiver participated in establishment of treatment plan and goals Yes  -AL    Patient would benefit from skilled therapy intervention Yes  -AL       SLP Plan    Frequency 1x per week  -AL    Duration 3/20  -AL    Planned CPT's? SLP INDIVIDUAL SPEECH THERAPY: 12836  -AL    Expected Duration of Therapy Session (SLP Eval) 45  -AL    Plan Comments Continue with POC  -AL          User Key  (r) = Recorded By, (t) = Taken By, (c) = Cosigned By    Initials Name Provider Type    Vida Tejada, SLP Speech and Language Pathologist                                   SLP OP Goals     Row Name 01/24/23 1046          Goal Type Needed    Goal Type Needed Pediatric Goals  -AL        Subjective Comments    Subjective Comments Pt was very tired today and actively falling asleep during the session. She had dry skin on her cheeks and eye lids that she was complaining.  -AL        Subjective Pain    Able to rate subjective pain? yes  -AL     Pre-Treatment Pain Level 0  -AL     Post-Treatment Pain Level 0  -AL     Subjective Pain Comment \"I am tired and my face itches.\"  -AL        Dysarthria Goals     Dysarthria LTG's Patient will be able to communicate a message that is comprehensible to familiar/unfamiliar listeners utilizing verbal speech and augmentative strategies  -AL     Patient will be able to communicate a message that is comprehensible to familiar/unfamiliar listeners utilizing verbal speech and augmentative strategies 80%:  -AL     Status: Patient will be able to communicate a message that is comprehensible to familiar/unfamiliar listeners utilizing verbal speech and augmentative strategies Progressing as expected  -AL     Comments: Patient will be able to communicate a message that is comprehensible to familiar/unfamiliar listeners utilizing verbal speech and augmentative strategies Kourtney still is very " unintelligible when talking in conversation. Grandma stated that often they couldn't’t understand her when she comes over. Grandma stated that she works with her on the homework. Kourtney still is very unintelligible when talking in conversation. Kourtney’s speech is fast and lacks articulatory precision, which affects her overall intelligibility. Pt speech is fast and lacks articulatory precision, which affects her overall intelligibility.  She has specific lateralization with production of /s/ and /z/, which affects her intelligibility. She is also dropping syllables in multisyllabic words. She drops the middle sound. She is understood about 60% of the time in connected speech to an unfamiliar listener.  -AL      Dysarthria STG's Patient will apply compensatory strategies to improve intelligibility of speech during in spontaneous speech  -AL     Patient will apply compensatory strategies to improve intelligibility of speech during in spontaneous speech 80%:  -AL     Status: Patient will apply compensatory strategies to improve intelligibility of speech during in spontaneous speech Progressing as expected  -AL     Comments: Patient will apply compensatory strategies to improve intelligibility of speech during in spontaneous speech Today Kourtney tried out some oral motor exercises to strengthen her tongue.  oKurtney correctly utilized these exercises 3X in the session. Tx focused on slowing rate and pacing at the syllable or word level in connected speech.  10 functional phrases were utilized. Kourtney easily completed this session. However, today she said her functional phrases beautifully and nice and clear and needed no cueing.  We also utilized some silly sentences today. During conversation, she had to be reminded to slow down multiple times. She completed this with 65% accuracy. She needed cueing to slow down. Mom  -AL        Other Goals    Other Adult Goal- 1 Mom will encourage HEP to facilitate carryover  and report back weekly on progress  -AL     Status: Other Adult Goal- 1 Progress slower than expected  -AL     Comments: Other Adult Goal- 1 Mom is encouraging use of pacing at home during the day. She has begun to notice a difference in Kourtney's speech at home. In conversation, Kourtney still talks super-fast and needs max cueing to slow down and over articulate. She needed this reminded 45 X in conversation today. She even has a list of strategies in front of her.  -AL     Other Adult Goal- 2 Pt will eliminate lateralization during production of /s/ and /s/ blends at the phrase level 6/10 trials.  -AL     Status: Other Adult Goal- 2 Progressing as expected  -AL     Comments: Other Adult Goal- 2 We are utilizing the technique of the exploding /t/ technique. She was taught to say the /t/ sound 4 times and on the 5th one to hold is out as long as she could.  We will continue to practice. She accurately produced it 75% of the trials  -AL     Other Adult Goal- 3 In order to improve intellgibility , Kourtney will produce 3 syllable words in isolation with 80% accuracy.  -AL     Status: Other Adult Goal- 3 Progressing as expected  -AL     Comments: Other Adult Goal- 3 We worked on multisyllabic words 3 syllables and 4 syllables- she produced these with 65% acc with min cues from the clinician. She produced these correctly with 70% acc with mod verbal cues to the clinician to slow down.  -AL        SLP Time Calculation    SLP Goal Re-Cert Due Date 02/16/23  -AL           User Key  (r) = Recorded By, (t) = Taken By, (c) = Cosigned By    Initials Name Provider Type    Vida Tejada SLP Speech and Language Pathologist               OP SLP Education     Row Name 01/24/23 1046       Education    Barriers to Learning Decreased comprehension  -AL    Action Taken to Address Barriers education with mother for HEP  -AL    Education Provided Patient demonstrated recommended strategies;Family/caregivers demonstrated  recommended strategies;Patient requires further education on strategies, risks;Family/caregivers require further education on strategies, risks  -AL    Assessed Learning needs;Learning motivation;Learning readiness;Learning preferences  -AL    Learning Motivation Moderate  -AL    Learning Method Explanation;Demonstration  -AL    Teaching Response Verbalized understanding;Demonstrated understanding  -AL    Education Comments Mom was given P papers with Kourtney's strategies to be utilized at home.  -AL          User Key  (r) = Recorded By, (t) = Taken By, (c) = Cosigned By    Initials Name Effective Dates    Vida Tejada SLP 09/22/22 -                      Time Calculation:   SLP Start Time: 1046  SLP Stop Time: 1124  SLP Time Calculation (min): 38 min  Untimed Charges  65534-LZ Treatment/ST Modification Prosth Aug Alter : 38  Total Minutes  Untimed Charges Total Minutes: 38   Total Minutes: 38    Therapy Charges for Today     Code Description Service Date Service Provider Modifiers Qty    19306119441 HC ST TREATMENT SPEECH 3 1/24/2023 Vida Clement SLP GN 1                   JONO Keith  1/24/2023

## 2023-01-31 ENCOUNTER — APPOINTMENT (OUTPATIENT)
Dept: SPEECH THERAPY | Facility: HOSPITAL | Age: 23
End: 2023-01-31
Payer: MEDICAID

## 2023-02-07 ENCOUNTER — HOSPITAL ENCOUNTER (OUTPATIENT)
Dept: SPEECH THERAPY | Facility: HOSPITAL | Age: 23
Setting detail: THERAPIES SERIES
Discharge: HOME | End: 2023-02-07
Payer: MEDICAID

## 2023-02-07 DIAGNOSIS — R47.1 DYSARTHRIA: Primary | ICD-10-CM

## 2023-02-07 DIAGNOSIS — Q90.9 DOWN SYNDROME: ICD-10-CM

## 2023-02-07 PROCEDURE — 92507 TX SP LANG VOICE COMM INDIV: CPT

## 2023-02-07 NOTE — THERAPY TREATMENT NOTE
Outpatient Speech Language Pathology   Adult Speech Language Cognitive Treatment Note  [facility]     Patient Name: Kourtney Townsend  : 2000  MRN: [id number]  Today's Date: 2023         Visit Date: 2023   There is no problem list on file for this patient.         Visit Dx:    ICD-10-CM ICD-9-CM   1. Dysarthria  R47.1 784.51   2. Down syndrome  Q90.9 758.0        OP SLP Assessment/Plan - 23 1041        SLP Assessment    Functional Problems Speech Language- Adult/Cognition  -AL    Impact on Function: Adult Speech Language/Cognition Difficulty communicating wants, needs, and ideas;Difficulty communicating in a medical emergency;Restrictions in personal and social life;Lack of insight or awareness of deficits, safety issues  -AL    Clinical Impression: Speech Language-Adult/Congnition Moderate-Severe:;Other (comment)   Dysarthria -AL    Functional Problems Comment Kourtney has decreased intelligibility that is associated with her down syndrome dx.  Her dx of ADHD also affects her ability to utilize compensatory strategies to improve her intelligibility.  -AL    Clinical Impression Comments Kourtney arrived to speech therapy energetic and in a good mood. Pt was in good spirits today. Therapy to focus on building her articulation skills to increase intelligibility to continue to build the skills and foundation so unfamiliar listeners can understand her. Kourtney still is very unintelligible when talking in conversation. Kourtney still is very unintelligible when talking in conversation. Mom stated that often they cannot understand her at home. Kourtney’s speech is fast and lacks articulatory precision, which affects her overall intelligibility. Pt speech is fast and lacks articulatory precision, which affects her overall intelligibility.  She has specific lateralization with production of /s/ and /z/, which affects her intelligibility. She is also dropping syllables in multisyllabic words.  She drops the middle sound. She is understood about 50% of the time in connected speech to an unfamiliar listener.  She has learned strategies to increase intelligibility including slow rate, pacing, and over articulation. She requires max cues to use strategies.  Mom indicates max cues are used at home to implement strategies. Today Kourtney tried out some oral motor exercises to strengthen her tongue.  Kourtney correctly utilized these exercises 5X in the session. Tx focused on slowing rate and pacing at the syllable or word level in connected speech.  10 functional phrases were utilized. Kourtney easily completed this session. However, today she said her functional phrases beautifully and nice and clear and needed no cueing.  We also utilized some silly sentences today. During conversation, she had to be reminded to slow down multiple times. She completed this with 65% accuracy. She needed cueing to slow down. Mom is encouraging use of pacing at home during the day. She has begun to notice a difference in Kourtney's speech at home. In conversation, Kourtney still talks super-fast and needs max cueing to slow down and over articulate. She needed this reminded 20X in conversation today. She even has a list of strategies in front of her. We continued technique of the exploding /t/ technique. She was taught to say the /t/ sound 4 times and on the 5th one to hold is out as long as she could.  We will continue to practice. She accurately produced it 75% of the trials. We worked on multisyllabic words 3 syllables and 4 syllables- she produced these with 60% acc with min cues from the clinician. She produced these correctly with 75% acc with mod verbal cues to the clinician to slow down. We also worked on tongue twisters and sentence pacing. Kourtney will benefit from speech therapy to address education and implementation of compensatory strategies to improve intelligibility.  -AL    Please refer to paper survey for  "additional self-reported information Yes  -AL    Please refer to items scanned into chart for additional diagnostic informaiton and handouts as provided by clinician Yes  -AL    Prognosis Fair (comment)  -AL    Patient/caregiver participated in establishment of treatment plan and goals Yes  -AL    Patient would benefit from skilled therapy intervention Yes  -AL       SLP Plan    Frequency 1x per week  -AL    Duration 4/20  -AL    Planned CPT's? SLP INDIVIDUAL SPEECH THERAPY: 19544  -AL    Expected Duration of Therapy Session (SLP Eval) 45  -AL    Plan Comments Try out new pacing and intellgibility strategies. The carryover from session to session and at home is not great.  -AL          User Key  (r) = Recorded By, (t) = Taken By, (c) = Cosigned By    Initials Name Provider Type    Vida Tejada, SLP Speech and Language Pathologist                                   SLP OP Goals     Row Name 02/07/23 1041          Goal Type Needed    Goal Type Needed Dysarthria;Other Adult Goals  -AL        Subjective Comments    Subjective Comments Pt was in good spirits and was joking around with the PT clinician today. The PT clinician rated her speech intellgibility at a 45%.  -AL        Subjective Pain    Able to rate subjective pain? yes  -AL     Pre-Treatment Pain Level 0  -AL     Post-Treatment Pain Level 0  -AL     Subjective Pain Comment \"I am fine. My face is just itchy.\"  -AL        Dysarthria Goals     Dysarthria LTG's Patient will be able to communicate a message that is comprehensible to familiar/unfamiliar listeners utilizing verbal speech and augmentative strategies  -AL     Patient will be able to communicate a message that is comprehensible to familiar/unfamiliar listeners utilizing verbal speech and augmentative strategies 80%:  -AL     Status: Patient will be able to communicate a message that is comprehensible to familiar/unfamiliar listeners utilizing verbal speech and augmentative strategies Progressing as " expected  -AL     Comments: Patient will be able to communicate a message that is comprehensible to familiar/unfamiliar listeners utilizing verbal speech and augmentative strategies . Kourtney still is very unintelligible when talking in conversation. Kourtney still is very unintelligible when talking in conversation. Mom stated that often they cannot understand her at home. Kourtney’s speech is fast and lacks articulatory precision, which affects her overall intelligibility. Pt speech is fast and lacks articulatory precision, which affects her overall intelligibility.  She has specific lateralization with production of /s/ and /z/, which affects her intelligibility. She is also dropping syllables in multisyllabic words. She drops the middle sound. She is understood about 50% of the time in connected speech to an unfamiliar listener.  She has learned strategies to increase intelligibility including slow rate, pacing, and over articulation. She requires max cues to use strategies.  Mom indicates max cues are used at home to implement strategies.  -AL     Patient will apply compensatory strategies to improve intelligibility of speech during in spontaneous speech 80%:  -AL     Status: Patient will apply compensatory strategies to improve intelligibility of speech during in spontaneous speech Progressing as expected  -AL     Comments: Patient will apply compensatory strategies to improve intelligibility of speech during in spontaneous speech Today Kourtney tried out some oral motor exercises to strengthen her tongue.  Kourtney correctly utilized these exercises 5X in the session. Tx focused on slowing rate and pacing at the syllable or word level in connected speech.  10 functional phrases were utilized. Kourtney easily completed this session. However, today she said her functional phrases beautifully and nice and clear and needed no cueing.  We also utilized some silly sentences today. During conversation, she had to  be reminded to slow down multiple times. She completed this with 65% accuracy. She needed cueing to slow down  -AL        Other Goals    Other Adult Goal- 1 Mom will encourage HEP to facilitate carryover and report back weekly on progress  -AL     Status: Other Adult Goal- 1 Progress slower than expected  -AL     Comments: Other Adult Goal- 1 Mom is encouraging use of pacing at home during the day. She has begun to notice a difference in Kourtney's speech at home. In conversation, Kourtney still talks super-fast and needs max cueing to slow down and over articulate. She needed this reminded 45 X in conversation today. She even has a list of strategies in front of her.  -AL     Other Adult Goal- 2 Pt will eliminate lateralization during production of /s/ and /s/ blends at the phrase level 6/10 trials.  -AL     Status: Other Adult Goal- 2 Progressing as expected  -AL     Comments: Other Adult Goal- 2 In conversation, Kourtney still talks super-fast and needs max cueing to slow down and over articulate. She needed this reminded 20X in conversation today. She even has a list of strategies in front of her. We continued technique of the exploding /t/ technique. She was taught to say the /t/ sound 4 times and on the 5th one to hold is out as long as she could.  We will continue to practice. She accurately produced it 75% of the trials.  -AL     Other Adult Goal- 3 In order to improve intellgibility , Kourtney will produce 3 syllable words in isolation with 80% accuracy.  -AL     Status: Other Adult Goal- 3 Progressing as expected  -AL     Comments: Other Adult Goal- 3 We worked on multisyllabic words 3 syllables and 4 syllables- she produced these with 60% acc with min cues from the clinician. She produced these correctly with 75% acc with mod verbal cues to the clinician to slow down.  -AL        SLP Time Calculation    SLP Goal Re-Cert Due Date 02/16/23  -AL           User Key  (r) = Recorded By, (t) = Taken By, (c) =  Cosigned By    Initials Name Provider Type    Vida Tejada SLP Speech and Language Pathologist               OP SLP Education     Row Name 02/07/23 1041       Education    Barriers to Learning Decreased comprehension  -AL    Action Taken to Address Barriers education with mother for HEP  -AL    Education Provided Patient demonstrated recommended strategies;Family/caregivers demonstrated recommended strategies;Patient requires further education on strategies, risks;Family/caregivers require further education on strategies, risks  -AL    Assessed Learning needs;Learning motivation;Learning readiness;Learning preferences  -AL    Learning Motivation Moderate  -AL    Learning Method Explanation;Demonstration  -AL    Teaching Response Verbalized understanding;Demonstrated understanding  -AL    Education Comments Mom was given HTP papers with Kourtney's strategies to be utilized at home. The strategies from week to week are said being utilized at home, but there is no carryover between session.  -AL          User Key  (r) = Recorded By, (t) = Taken By, (c) = Cosigned By    Initials Name Effective Dates    Vida Tejada SLP 09/22/22 -                      Time Calculation:   SLP Start Time: 1041  SLP Stop Time: 1134  SLP Time Calculation (min): 53 min  Untimed Charges  93842-IC Treatment/ST Modification Prosth Aug Alter : 53  Total Minutes  Untimed Charges Total Minutes: 53   Total Minutes: 53    Therapy Charges for Today     Code Description Service Date Service Provider Modifiers Qty    [id number] HC ST TREATMENT SPEECH 4 2/7/2023 Vida Clement SLP GN 1                   JONO Keith  2/7/2023

## 2023-02-14 ENCOUNTER — APPOINTMENT (OUTPATIENT)
Dept: SPEECH THERAPY | Facility: HOSPITAL | Age: 23
End: 2023-02-14
Payer: MEDICAID

## 2023-02-21 ENCOUNTER — APPOINTMENT (OUTPATIENT)
Dept: SPEECH THERAPY | Facility: HOSPITAL | Age: 23
End: 2023-02-21
Payer: MEDICAID

## 2023-03-14 ENCOUNTER — HOSPITAL ENCOUNTER (OUTPATIENT)
Dept: SPEECH THERAPY | Facility: HOSPITAL | Age: 23
Setting detail: THERAPIES SERIES
Discharge: HOME | End: 2023-03-14
Payer: MEDICAID

## 2023-03-14 DIAGNOSIS — R47.1 DYSARTHRIA: Primary | ICD-10-CM

## 2023-03-14 DIAGNOSIS — Q90.9 DOWN SYNDROME: ICD-10-CM

## 2023-03-14 PROCEDURE — 92507 TX SP LANG VOICE COMM INDIV: CPT

## 2023-03-14 NOTE — THERAPY PROGRESS REPORT/RE-CERT
Outpatient Speech Language Pathology   Adult Speech Language Cognitive Progress Note  [facility]     Patient Name: Kourtney Townsend  : 2000  MRN: [id number]  Today's Date: 3/14/2023         Visit Date: 2023   There is no problem list on file for this patient.         Visit Dx:    ICD-10-CM ICD-9-CM   1. Dysarthria  R47.1 784.51   2. Down syndrome  Q90.9 758.0        OP SLP Assessment/Plan - 23 1057        SLP Assessment    Functional Problems Speech Language- Adult/Cognition  -AL    Impact on Function: Adult Speech Language/Cognition Difficulty communicating wants, needs, and ideas;Difficulty communicating in a medical emergency;Restrictions in personal and social life;Lack of insight or awareness of deficits, safety issues  -AL    Clinical Impression: Speech Language-Adult/Congnition Moderate-Severe:;Other (comment)   Dysarthria -AL    Functional Problems Comment Kourtney has decreased intelligibility that is associated with her down syndrome dx.  Her dx of ADHD also affects her ability to utilize compensatory strategies to improve her intelligibility.  -AL    Clinical Impression Comments Today is Kourtney’s 30 day progress note. She is making very slow progress, due to not being seen. Pt did not want to be at skilled ST. She has not been seen since . Therapy to focus on building her articulation skills to increase intelligibility to continue to build the skills and foundation so unfamiliar listeners can understand her. Kourtney still is very unintelligible when talking in conversation. Kourtney still is very unintelligible when talking in conversation. Mom stated that often they cannot understand her at home. Kourtney’s speech is fast and lacks articulatory precision, which affects her overall intelligibility. Pt speech is fast and lacks articulatory precision, which affects her overall intelligibility.  She has specific lateralization with production of /s/ and /z/, which  affects her intelligibility. She is also dropping syllables in multisyllabic words. She drops the middle sound. She is understood about 55% of the time in connected speech to an unfamiliar listener.  She has learned strategies to increase intelligibility including slow rate, pacing, and over articulation. She requires max cues to use strategies.  Mom indicates max cues are used at home to implement strategies. Today Kourtney tried out some oral motor exercises to strengthen her tongue.  Kourtney correctly utilized these exercises 6X in the session. Tx focused on slowing rate and pacing at the syllable or word level in connected speech.  10 functional phrases were utilized. Kourtney easily completed this session. However, today she said her functional phrases beautifully and nice and clear and needed no cueing.  We also utilized some silly sentences today. During conversation, she had to be reminded to slow down multiple times. She completed this with 65% accuracy. She needed cueing to slow down. Mom is encouraging use of pacing at home during the day. She has begun to notice a difference in Kourtney's speech at home. In conversation, Kourtney still talks super-fast and needs max cueing to slow down and over articulate. She needed this reminded 45 X in conversation today. She even has a list of strategies in front of her. In conversation, Kourtney still talks super-fast and needs max cueing to slow down and over articulate. She needed this reminded 15X in conversation today. She even has a list of strategies in front of her. We continued technique of the exploding /t/ technique. She was taught to say the /t/ sound 4 times and on the 5th one to hold is out as long as she could.  We will continue to practice. She accurately produced it 80% of the trials. We worked on multisyllabic words 3 syllables and 4 syllables- she produced these with 60% acc with min cues from the clinician. She produced these correctly with 70%  "acc with mod verbal cues to the clinician to slow down. Kourtney will benefit from speech therapy to address education and implementation of compensatory strategies to improve intelligibility.  -AL    Please refer to paper survey for additional self-reported information Yes  -AL    Please refer to items scanned into chart for additional diagnostic informaiton and handouts as provided by clinician Yes  -AL    Prognosis Fair (comment)  -AL    Patient/caregiver participated in establishment of treatment plan and goals Yes  -AL    Patient would benefit from skilled therapy intervention Yes  -AL       SLP Plan    Frequency 1x per week  -AL    Duration 5/20  -AL    Planned CPT's? SLP INDIVIDUAL SPEECH THERAPY: 32758  -AL    Expected Duration of Therapy Session (SLP Eval) 45  -AL    Plan Comments Try out new pacing and intellgibility strategies. The carryover from session to session and at home is not great.  -AL          User Key  (r) = Recorded By, (t) = Taken By, (c) = Cosigned By    Initials Name Provider Type    Vida Tejada, SLP Speech and Language Pathologist                                   SLP OP Goals     Row Name 03/14/23 3331          Goal Type Needed    Goal Type Needed Other Adult Goals;Dysarthria  -AL        Subjective Comments    Subjective Comments Pt did not want to be at skilled . She has not been seen since February 7th.  -AL        Subjective Pain    Able to rate subjective pain? yes  -AL     Pre-Treatment Pain Level 0  -AL     Post-Treatment Pain Level 0  -AL     Subjective Pain Comment \"I feel fine.\"  -AL        Dysarthria Goals     Dysarthria LTG's Patient will be able to communicate a message that is comprehensible to familiar/unfamiliar listeners utilizing verbal speech and augmentative strategies  -AL     Patient will be able to communicate a message that is comprehensible to familiar/unfamiliar listeners utilizing verbal speech and augmentative strategies 80%:  -AL     Status: Patient " will be able to communicate a message that is comprehensible to familiar/unfamiliar listeners utilizing verbal speech and augmentative strategies Progressing as expected  -AL     Comments: Patient will be able to communicate a message that is comprehensible to familiar/unfamiliar listeners utilizing verbal speech and augmentative strategies . Kourtney still is very unintelligible when talking in conversation. Kourtney still is very unintelligible when talking in conversation. Mom stated that often they cannot understand her at home. Kourtney’s speech is fast and lacks articulatory precision, which affects her overall intelligibility. Pt speech is fast and lacks articulatory precision, which affects her overall intelligibility.  She has specific lateralization with production of /s/ and /z/, which affects her intelligibility. She is also dropping syllables in multisyllabic words. She drops the middle sound. She is understood about 55% of the time in connected speech to an unfamiliar listener.  She has learned strategies to increase intelligibility including slow rate, pacing, and over articulation. She requires max cues to use strategies.  Mom indicates max cues are used at home to implement strategies.  -AL      Dysarthria STG's Patient will apply compensatory strategies to improve intelligibility of speech during in spontaneous speech  -AL     Patient will apply compensatory strategies to improve intelligibility of speech during in spontaneous speech 80%:  -AL     Status: Patient will apply compensatory strategies to improve intelligibility of speech during in spontaneous speech Progressing as expected  -AL     Comments: Patient will apply compensatory strategies to improve intelligibility of speech during in spontaneous speech Today Kourtney tried out some oral motor exercises to strengthen her tongue.  Kourtney correctly utilized these exercises 6X in the session. Tx focused on slowing rate and pacing at the  syllable or word level in connected speech.  10 functional phrases were utilized. Kourtney easily completed this session. However, today she said her functional phrases beautifully and nice and clear and needed no cueing.  We also utilized some silly sentences today. During conversation, she had to be reminded to slow down multiple times. She completed this with 65% accuracy. She needed cueing to slow down  -AL        Other Goals    Other Adult Goal- 1 Mom will encourage HEP to facilitate carryover and report back weekly on progress  -AL     Status: Other Adult Goal- 1 Progress slower than expected  -AL     Comments: Other Adult Goal- 1 Mom is encouraging use of pacing at home during the day. She has begun to notice a difference in Kourtney's speech at home. In conversation, Kourtney still talks super-fast and needs max cueing to slow down and over articulate. She needed this reminded 45 X in conversation today. She even has a list of strategies in front of her.  -AL     Other Adult Goal- 2 Pt will eliminate lateralization during production of /s/ and /s/ blends at the phrase level 6/10 trials.  -AL     Status: Other Adult Goal- 2 Progressing as expected  -AL     Comments: Other Adult Goal- 2 In conversation, Kourtney still talks super-fast and needs max cueing to slow down and over articulate. She needed this reminded 15X in conversation today. She even has a list of strategies in front of her. We continued technique of the exploding /t/ technique. She was taught to say the /t/ sound 4 times and on the 5th one to hold is out as long as she could.  We will continue to practice. She accurately produced it 80% of the trials.  -AL     Other Adult Goal- 3 In order to improve intellgibility , Kourtney will produce 3 syllable words in isolation with 80% accuracy.  -AL     Status: Other Adult Goal- 3 Progressing as expected  -AL     Comments: Other Adult Goal- 3 We worked on multisyllabic words 3 syllables and 4 syllables-  she produced these with 60% acc with min cues from the clinician. She produced these correctly with 70% acc with mod verbal cues to the clinician to slow down.  -AL        SLP Time Calculation    SLP Goal Re-Cert Due Date 04/13/23  -AL           User Key  (r) = Recorded By, (t) = Taken By, (c) = Cosigned By    Initials Name Provider Type    Vida Tejada SLP Speech and Language Pathologist               OP SLP Education     Row Name 03/14/23 1057       Education    Barriers to Learning Decreased comprehension  -AL    Action Taken to Address Barriers education with mother for HEP  -AL    Education Provided Patient demonstrated recommended strategies;Family/caregivers demonstrated recommended strategies;Patient requires further education on strategies, risks;Family/caregivers require further education on strategies, risks  -AL    Assessed Learning needs;Learning motivation;Learning readiness;Learning preferences  -AL    Learning Motivation Moderate  -AL    Learning Method Explanation;Demonstration  -AL    Teaching Response Verbalized understanding;Demonstrated understanding  -AL    Education Comments Mom was given HTP papers with Kourtney's strategies to be utilized at home. The strategies from week to week are said being utilized at home, but there is no carryover between session.  -AL          User Key  (r) = Recorded By, (t) = Taken By, (c) = Cosigned By    Initials Name Effective Dates    Vida Tejada SLP 09/22/22 -                      Time Calculation:   SLP Start Time: 1057  SLP Stop Time: 1150  SLP Time Calculation (min): 53 min  Untimed Charges  50375-UC Treatment/ST Modification Prosth Aug Alter : 53  Total Minutes  Untimed Charges Total Minutes: 53   Total Minutes: 53    Therapy Charges for Today     Code Description Service Date Service Provider Modifiers Qty    [id number]  ST TREATMENT SPEECH 4 3/14/2023 Vida Clement SLP GN 1                   JONO Keith  3/14/2023

## 2023-03-21 ENCOUNTER — APPOINTMENT (OUTPATIENT)
Dept: SPEECH THERAPY | Facility: HOSPITAL | Age: 23
End: 2023-03-21
Payer: MEDICAID

## 2023-03-28 ENCOUNTER — HOSPITAL ENCOUNTER (OUTPATIENT)
Dept: SPEECH THERAPY | Facility: HOSPITAL | Age: 23
Setting detail: THERAPIES SERIES
Discharge: HOME | End: 2023-03-28
Payer: MEDICAID

## 2023-03-28 DIAGNOSIS — R47.1 DYSARTHRIA: Primary | ICD-10-CM

## 2023-03-28 DIAGNOSIS — Q90.9 DOWN SYNDROME: ICD-10-CM

## 2023-03-28 PROCEDURE — 92507 TX SP LANG VOICE COMM INDIV: CPT

## 2023-03-28 NOTE — THERAPY TREATMENT NOTE
Outpatient Speech Language Pathology   Peds Speech Language Treatment Note  [facility]     Patient Name: Kourtney Townsend  : 2000  MRN: [id number]  Today's Date: 3/28/2023      Visit Date: 2023    There is no problem list on file for this patient.      Visit Dx:    ICD-10-CM ICD-9-CM   1. Dysarthria  R47.1 784.51   2. Down syndrome  Q90.9 758.0        OP SLP Assessment/Plan - 23 1055        SLP Assessment    Functional Problems Speech Language- Adult/Cognition  -AL    Impact on Function: Adult Speech Language/Cognition Difficulty communicating wants, needs, and ideas;Difficulty communicating in a medical emergency;Restrictions in personal and social life;Lack of insight or awareness of deficits, safety issues  -AL    Clinical Impression: Speech Language-Adult/Congnition Moderate-Severe:;Other (comment)   Dysarthria -AL    Functional Problems Comment Kourtney has decresed intelligibility that is associated with her down syndrome dx.  Her dx of ADHD also affects her ability to utilize compensatory strategies to improve her intelligibility.  -AL    Clinical Impression Comments Kourtney arrived to speech therapy energetic and in a good mood. Pt was in good spirits today. Therapy to focus on building her articulation skills to increase intelligibility to continue to build the skills and foundation so unfamiliar listeners can understand her. Kourtney still is very unintelligible when talking in conversation. Kourtney still is very unintelligible when talking in conversation. Mom stated that often they cannot understand her at home. Kourtney’s speech is fast and lacks articulatory precision, which affects her overall intelligibility. Pt speech is fast and lacks articulatory precision, which affects her overall intelligibility.  She has specific lateralization with production of /s/ and /z/, which affects her intelligibility. She is also dropping syllables in multisyllabic words. She drops the  middle sound. She is understood about 60% of the time in connected speech to an unfamiliar listener.  She has learned strategies to increase intelligibility including slow rate, pacing, and over articulation. She requires max cues to use strategies.  Mom indicates max cues are used at home to implement strategies. Today Kourtney tried out some oral motor exercises to strengthen her tongue.  Kourtney correctly utilized these exercises 2X in the session. Tx focused on slowing rate and pacing at the syllable or word level in connected speech.  10 functional phrases were utilized. Kourtney easily completed this session. However, today she said her functional phrases beautifully and nice and clear and needed no cueing.  We also utilized some silly sentences today. During conversation, she had to be reminded to slow down multiple times. She completed this with 60% accuracy. She needed cueing to slow down. Mom is encouraging use of pacing at home during the day. She has begun to notice a difference in Kourtney's speech at home. In conversation, Kourtney still talks super-fast and needs max cueing to slow down and over articulate. She needed this reminded 20X in conversation today. She even has a list of strategies in front of her. We continued technique of the exploding /t/ technique. She was taught to say the /t/ sound 4 times and on the 5th one to hold is out as long as she could.  We will continue to practice. She accurately produced it 75% of the trials. We worked on multisyllabic words 3 syllables and 4 syllables- she produced these with 65% acc with min cues from the clinician. She produced these correctly with 75% acc with mod verbal cues to the clinician to slow down. We also worked on tongue twisters and sentence pacing. Kourtney will benefit from speech therapy to address education and implementation of compensatory strategies to improve intelligibility. -AL    Please refer to paper survey for additional  "self-reported information Yes  -AL    Please refer to items scanned into chart for additional diagnostic informaiton and handouts as provided by clinician Yes  -AL    Prognosis Fair (comment)  -AL    Patient/caregiver participated in establishment of treatment plan and goals Yes  -AL    Patient would benefit from skilled therapy intervention Yes  -AL       SLP Plan    Frequency 1x per week  -AL    Duration 6/20  -AL    Planned CPT's? SLP INDIVIDUAL SPEECH THERAPY: 52665  -AL    Expected Duration of Therapy Session (SLP Eval) 45  -AL    Plan Comments Try out new pacing and intellgibility strategies. The carryover from session to session and at home is not great.  -AL          User Key  (r) = Recorded By, (t) = Taken By, (c) = Cosigned By    Initials Name Provider Type    Vida Tejada, SLP Speech and Language Pathologist                     SLP OP Goals     Row Name 03/28/23 1055          Goal Type Needed    Goal Type Needed Other Adult Goals;Dysarthria  -AL        Subjective Comments    Subjective Comments Pt was in good spirits. Her grandmother brought her to the session and warned the clinician that she was in a mood this morning.  -AL        Subjective Pain    Able to rate subjective pain? yes  -AL     Pre-Treatment Pain Level 0  -AL     Post-Treatment Pain Level 0  -AL     Subjective Pain Comment \"I am fine.\"  -AL        Dysarthria Goals     Dysarthria LTG's Patient will be able to communicate a message that is comprehensible to familiar/unfamiliar listeners utilizing verbal speech and augmentative strategies  -AL     Patient will be able to communicate a message that is comprehensible to familiar/unfamiliar listeners utilizing verbal speech and augmentative strategies 80%:  -AL     Status: Patient will be able to communicate a message that is comprehensible to familiar/unfamiliar listeners utilizing verbal speech and augmentative strategies Progressing as expected  -AL     Comments: Patient will be able to " communicate a message that is comprehensible to familiar/unfamiliar listeners utilizing verbal speech and augmentative strategies . Kourtney still is very unintelligible when talking in conversation. Kourtney still is very unintelligible when talking in conversation. Mom stated that often they cannot understand her at home. Kourtney’s speech is fast and lacks articulatory precision, which affects her overall intelligibility. Pt speech is fast and lacks articulatory precision, which affects her overall intelligibility.  She has specific lateralization with production of /s/ and /z/, which affects her intelligibility. She is also dropping syllables in multisyllabic words. She drops the middle sound. She is understood about 60% of the time in connected speech to an unfamiliar listener.  She has learned strategies to increase intelligibility including slow rate, pacing, and over articulation. She requires max cues to use strategies.  Mom indicates max cues are used at home to implement strategies.  -AL      Dysarthria STG's Patient will apply compensatory strategies to improve intelligibility of speech during in spontaneous speech  -AL     Patient will apply compensatory strategies to improve intelligibility of speech during in spontaneous speech 80%:  -AL     Status: Patient will apply compensatory strategies to improve intelligibility of speech during in spontaneous speech Progressing as expected  -AL     Comments: Patient will apply compensatory strategies to improve intelligibility of speech during in spontaneous speech Today Kourtney tried out some oral motor exercises to strengthen her tongue.  Kourtney correctly utilized these exercises 2X in the session. Tx focused on slowing rate and pacing at the syllable or word level in connected speech.  10 functional phrases were utilized. Kourtney easily completed this session. However, today she said her functional phrases beautifully and nice and clear and needed no  cueing.  We also utilized some silly sentences today. During conversation, she had to be reminded to slow down multiple times. She completed this with 60% accuracy. She needed cueing to slow down  -AL        Other Goals    Other Adult Goal- 1 Mom will encourage HEP to facilitate carryover and report back weekly on progress  -AL     Status: Other Adult Goal- 1 Progress slower than expected  -AL     Comments: Other Adult Goal- 1 Mom is encouraging use of pacing at home during the day. She has begun to notice a difference in Kourtney's speech at home. In conversation, Kourtney still talks super-fast and needs max cueing to slow down and over articulate. She needed this reminded 20X in conversation today. She even has a list of strategies in front of her.  -AL     Other Adult Goal- 2 Pt will eliminate lateralization during production of /s/ and /s/ blends at the phrase level 6/10 trials.  -AL     Status: Other Adult Goal- 2 Progressing as expected  -AL     Comments: Other Adult Goal- 2 In conversation, Kourtney still talks super-fast and needs max cueing to slow down and over articulate. She needed this reminded 20X in conversation today. She even has a list of strategies in front of her. We continued technique of the exploding /t/ technique. She was taught to say the /t/ sound 4 times and on the 5th one to hold is out as long as she could.  We will continue to practice. She accurately produced it 75% of the trials.  -AL     Other Adult Goal- 3 In order to improve intellgibility , Kourtney will produce 3 syllable words in isolation with 80% accuracy.  -AL     Status: Other Adult Goal- 3 Progressing as expected  -AL     Comments: Other Adult Goal- 3 We worked on multisyllabic words 3 syllables and 4 syllables- she produced these with 65% acc with min cues from the clinician. She produced these correctly with 70% acc with mod verbal cues to the clinician to slow down.  -AL        SLP Time Calculation    SLP Goal Re-Cert  Due Date 04/13/23  -AL           User Key  (r) = Recorded By, (t) = Taken By, (c) = Cosigned By    Initials Name Provider Type    Vida Tejada SLP Speech and Language Pathologist               OP SLP Education     Row Name 03/28/23 1055       Education    Barriers to Learning Decreased comprehension  -AL    Action Taken to Address Barriers education with mother for HEP  -AL    Education Provided Patient demonstrated recommended strategies;Family/caregivers demonstrated recommended strategies;Patient requires further education on strategies, risks;Family/caregivers require further education on strategies, risks  -AL    Assessed Learning needs;Learning motivation;Learning readiness;Learning preferences  -AL    Learning Motivation Moderate  -AL    Learning Method Explanation;Demonstration  -AL    Teaching Response Verbalized understanding;Demonstrated understanding  -AL    Education Comments Mom was given HTP papers with Kourtney's strategies to be utilized at home. The strategies from week to week are said being utilized at home, but there is no carryover between session.  -AL          User Key  (r) = Recorded By, (t) = Taken By, (c) = Cosigned By    Initials Name Effective Dates    Vida Tejada SLP 09/22/22 -                    Time Calculation:   SLP Start Time: 1055  SLP Stop Time: 1138  SLP Time Calculation (min): 43 min  Untimed Charges  05057-GN Treatment/ST Modification Prosth Aug Alter : 43  Total Minutes  Untimed Charges Total Minutes: 43   Total Minutes: 43    Therapy Charges for Today     Code Description Service Date Service Provider Modifiers Qty    [id number]  ST TREATMENT SPEECH 3 3/28/2023 Vida Clement SLP GN 1                     Vida Clement M.S. CCC- SLP  3/28/2023

## 2023-04-04 ENCOUNTER — HOSPITAL ENCOUNTER (OUTPATIENT)
Dept: SPEECH THERAPY | Facility: HOSPITAL | Age: 23
Setting detail: THERAPIES SERIES
Discharge: HOME | End: 2023-04-04
Payer: MEDICAID

## 2023-04-04 DIAGNOSIS — Q90.9 DOWN SYNDROME: ICD-10-CM

## 2023-04-04 DIAGNOSIS — R47.1 DYSARTHRIA: Primary | ICD-10-CM

## 2023-04-04 PROCEDURE — 92507 TX SP LANG VOICE COMM INDIV: CPT

## 2023-04-04 NOTE — THERAPY TREATMENT NOTE
Outpatient Speech Language Pathology   Adult Speech Language Cognitive Treatment Note  [facility]     Patient Name: Kourtney Townsend  : 2000  MRN: [id number]  Today's Date: 2023         Visit Date: 2023   There is no problem list on file for this patient.         Visit Dx:    ICD-10-CM ICD-9-CM   1. Dysarthria  R47.1 784.51   2. Down syndrome  Q90.9 758.0        OP SLP Assessment/Plan - 23 1059        SLP Assessment    Functional Problems Speech Language- Adult/Cognition  -AL    Impact on Function: Adult Speech Language/Cognition Difficulty communicating wants, needs, and ideas;Difficulty communicating in a medical emergency;Restrictions in personal and social life;Lack of insight or awareness of deficits, safety issues  -AL    Clinical Impression: Speech Language-Adult/Congnition Moderate-Severe:;Other (comment)   Dysarthria -AL    Functional Problems Comment Kourtney has decresed intelligibility that is associated with her down syndrome dx.  Her dx of ADHD also affects her ability to utilize compensatory strategies to improve her intelligibility.  -AL    Clinical Impression Comments Kourtney arrived to speech therapy energetic and in a good mood. Pt was in good spirits today. Therapy to focus on building her articulation skills to increase intelligibility to continue to build the skills and foundation so unfamiliar listeners can understand her. Kourtney still is very unintelligible when talking in conversation. Kourtney still is very unintelligible when talking in conversation. Mom stated that often they cannot understand her at home. Kourtney’s speech is fast and lacks articulatory precision, which affects her overall intelligibility. Pt speech is fast and lacks articulatory precision, which affects her overall intelligibility.  She has specific lateralization with production of /s/ and /z/, which affects her intelligibility. She is also dropping syllables in multisyllabic words. She  drops the middle sound. She is understood about 65% of the time in connected speech to an unfamiliar listener.  She has learned strategies to increase intelligibility including slow rate, pacing, and over articulation. She requires max cues to use strategies.  Mom indicates max cues are used at home to implement strategies. Today Kourtney tried out some oral motor exercises to strengthen her tongue.  Kourtney correctly utilized these exercises 4X in the session. Tx focused on slowing rate and pacing at the syllable or word level in connected speech.  10 functional phrases were utilized. Kourtney easily completed this session. However, today she said her functional phrases beautifully and nice and clear and needed no cueing.  We also utilized some silly sentences today. During conversation, she had to be reminded to slow down multiple times. She completed this with 65% accuracy. She needed cueing to slow down. Mom is encouraging use of pacing at home during the day. She has begun to notice a difference in Kourtney's speech at home. In conversation, Kourtney still talks super-fast and needs max cueing to slow down and over articulate. She needed this reminded 15X in conversation today. She even has a list of strategies in front of her. In conversation, Kourtney still talks super-fast and needs max cueing to slow down and over articulate. She needed this reminded 20X in conversation today. She even has a list of strategies in front of her. We continued technique of the exploding /t/ technique. She was taught to say the /t/ sound 2 times and on the 3rd one to hold is out as long as she could.  -AL    Please refer to paper survey for additional self-reported information Yes  -AL    Please refer to items scanned into chart for additional diagnostic informaiton and handouts as provided by clinician Yes  -AL    Prognosis Fair (comment)  -AL    Patient/caregiver participated in establishment of treatment plan and goals Yes   "-AL    Patient would benefit from skilled therapy intervention Yes  -AL       SLP Plan    Frequency 1x per week  -AL    Duration 7/20  -AL    Planned CPT's? SLP INDIVIDUAL SPEECH THERAPY: 76409  -AL    Expected Duration of Therapy Session (SLP [place]) 45  -AL    Plan Comments Try out new pacing and intellgibility strategies. The carryover from session to session and at home is not great.  -AL          User Key  (r) = Recorded By, (t) = Taken By, (c) = Cosigned By    Initials Name Provider Type    Vida Tejada, SLP Speech and Language Pathologist                                   SLP OP Goals     Row Name 04/04/23 1059          Goal Type Needed    Goal Type Needed Other Adult Goals;Dysarthria  -AL        Subjective Comments    Subjective Comments Pt was in good spirits and worked hard on all of her activities today.  -AL        Subjective Pain    Able to rate subjective pain? yes  -AL     Pre-Treatment Pain Level 0  -AL     Post-Treatment Pain Level 0  -AL     Subjective Pain Comment \"I am good.\"  -AL        Dysarthria Goals     Dysarthria LTG's Patient will be able to communicate a message that is comprehensible to familiar/unfamiliar listeners utilizing verbal speech and augmentative strategies  -AL     Patient will be able to communicate a message that is comprehensible to familiar/unfamiliar listeners utilizing verbal speech and augmentative strategies 80%:  -AL     Status: Patient will be able to communicate a message that is comprehensible to familiar/unfamiliar listeners utilizing verbal speech and augmentative strategies Progressing as expected  -AL     Comments: Patient will be able to communicate a message that is comprehensible to familiar/unfamiliar listeners utilizing verbal speech and augmentative strategies . Kourtney still is very unintelligible when talking in conversation. Kourtney still is very unintelligible when talking in conversation. Mom stated that often they cannot understand her at " home. Kourtney’s speech is fast and lacks articulatory precision, which affects her overall intelligibility. Pt speech is fast and lacks articulatory precision, which affects her overall intelligibility.  She has specific lateralization with production of /s/ and /z/, which affects her intelligibility. She is also dropping syllables in multisyllabic words. She drops the middle sound. She is understood about 65% of the time in connected speech to an unfamiliar listener.  She has learned strategies to increase intelligibility including slow rate, pacing, and over articulation. She requires max cues to use strategies.  Mom indicates max cues are used at home to implement strategies.  -AL      Dysarthria STG's Patient will apply compensatory strategies to improve intelligibility of speech during in spontaneous speech  -AL     Patient will apply compensatory strategies to improve intelligibility of speech during in spontaneous speech 80%:  -AL     Status: Patient will apply compensatory strategies to improve intelligibility of speech during in spontaneous speech Progressing as expected  -AL     Comments: Patient will apply compensatory strategies to improve intelligibility of speech during in spontaneous speech Today Kourtney tried out some oral motor exercises to strengthen her tongue.  Kourtney correctly utilized these exercises 4X in the session. Tx focused on slowing rate and pacing at the syllable or word level in connected speech.  10 functional phrases were utilized. Kourtney easily completed this session. However, today she said her functional phrases beautifully and nice and clear and needed no cueing.  We also utilized some silly sentences today. During conversation, she had to be reminded to slow down multiple times. She completed this with 65% accuracy. She needed cueing to slow down  -AL        Other Goals    Other Adult Goal- 1 Mom will encourage HEP to facilitate carryover and report back weekly on  progress  -AL     Status: Other Adult Goal- 1 Progress slower than expected  -AL     Comments: Other Adult Goal- 1 Mom is encouraging use of pacing at home during the day. She has begun to notice a difference in Kourtney's speech at home. In conversation, Kourtney still talks super-fast and needs max cueing to slow down and over articulate. She needed this reminded 15X in conversation today. She even has a list of strategies in front of her.  -AL     Other Adult Goal- 2 Pt will eliminate lateralization during production of /s/ and /s/ blends at the phrase level 6/10 trials.  -AL     Status: Other Adult Goal- 2 Progressing as expected  -AL     Comments: Other Adult Goal- 2 In conversation, Kourtney still talks super-fast and needs max cueing to slow down and over articulate. She needed this reminded 20X in conversation today. She even has a list of strategies in front of her. We continued technique of the exploding /t/ technique. She was taught to say the /t/ sound 2 times and on the 3rd one to hold is out as long as she could.  We will continue to practice. She accurately produced it 75% of the trials.  -AL     Other Adult Goal- 3 In order to improve intellgibility , Kourtney will produce 3 syllable words in isolation with 80% accuracy.  -AL     Status: Other Adult Goal- 3 Progressing as expected  -AL     Comments: Other Adult Goal- 3 We worked on multisyllabic words 3 syllables and 4 syllables- she produced these with 70% acc with min cues from the clinician. She produced these correctly with 70% acc with mod verbal cues to the clinician to slow down.  -AL        SLP Time Calculation    SLP Goal Re-Cert Due Date 04/13/23  -AL           User Key  (r) = Recorded By, (t) = Taken By, (c) = Cosigned By    Initials Name Provider Type    Vida Tejada, SLP Speech and Language Pathologist               OP SLP Education     Row Name 04/04/23 1059       Education    Barriers to Learning Decreased comprehension  -AL     Action Taken to Address Barriers education with mother for HEP  -AL    Education Provided Patient demonstrated recommended strategies;Family/caregivers demonstrated recommended strategies;Patient requires further education on strategies, risks;Family/caregivers require further education on strategies, risks  -AL    Assessed Learning needs;Learning motivation;Learning readiness;Learning preferences  -AL    Learning Motivation Moderate  -AL    Learning Method Explanation;Demonstration  -AL    Teaching Response Verbalized understanding;Demonstrated understanding  -AL    Education Comments Mom was given HTP papers with Kourtney's strategies to be utilized at home. The strategies from week to week are said being utilized at home, but there is no carryover between session.  -AL          User Key  (r) = Recorded By, (t) = Taken By, (c) = Cosigned By    Initials Name Effective Dates    Vida Tejada SLP 09/22/22 -                      Time Calculation:   SLP Start Time: 1059  SLP Stop Time: 1140  SLP Time Calculation (min): 41 min  Untimed Charges  24034-CN Treatment/ST Modification Prosth Aug Alter : 41  Total Minutes  Untimed Charges Total Minutes: 41   Total Minutes: 41    Therapy Charges for Today     Code Description Service Date Service Provider Modifiers Qty    [id number] HC ST TREATMENT SPEECH 3 4/4/2023 Vida Clement, SLP GN 1                   Vida Clement M.S. [facility] SLP  4/4/2023

## 2023-04-11 ENCOUNTER — HOSPITAL ENCOUNTER (OUTPATIENT)
Dept: SPEECH THERAPY | Facility: HOSPITAL | Age: 23
Setting detail: THERAPIES SERIES
Discharge: HOME | End: 2023-04-11
Payer: MEDICAID

## 2023-04-11 DIAGNOSIS — R47.1 DYSARTHRIA: Primary | ICD-10-CM

## 2023-04-11 DIAGNOSIS — Q90.9 DOWN SYNDROME: ICD-10-CM

## 2023-04-11 PROCEDURE — 92507 TX SP LANG VOICE COMM INDIV: CPT

## 2023-04-11 NOTE — THERAPY RE-EVALUATION
Outpatient Speech Language Pathology   Adult Speech Language Cognitive Re-Evaluation  [facility]     Patient Name: Kourtney Townsend  : 2000  MRN: [id number]  Today's Date: 2023        Visit Date: 2023   There is no problem list on file for this patient.       Past Medical History:   Diagnosis Date   • ADHD (attention deficit hyperactivity disorder)    • Alopecia    • Asthma    • Down syndrome         No past surgical history on file.      Visit Dx:    ICD-10-CM ICD-9-CM   1. Dysarthria  R47.1 784.51   2. Down syndrome  Q90.9 758.0            OP SLP Assessment/Plan - 23 1053        SLP Assessment    Functional Problems Speech Language- Adult/Cognition  -AL    Impact on Function: Adult Speech Language/Cognition Difficulty communicating wants, needs, and ideas;Difficulty communicating in a medical emergency;Restrictions in personal and social life;Lack of insight or awareness of deficits, safety issues  -AL    Clinical Impression: Speech Language-Adult/Congnition Moderate-Severe:;Other (comment)   Dysarthria -AL    Functional Problems Comment Kourtney has decresed intelligibility that is associated with her down syndrome dx.  Her dx of ADHD also affects her ability to utilize compensatory strategies to improve her intelligibility.  -AL    Clinical Impression Comments Today is Kourtney’s 6-month re-evaluation. She is making very slow progress. Kourtney arrived to speech therapy energetic and in a good mood. Pt was in good spirits today. Therapy to focus on building her articulation skills to increase intelligibility to continue to build the skills and foundation so unfamiliar listeners can understand her.  Kourtney still is very unintelligible when talking in conversation. Kourtney still is very unintelligible when talking in conversation. Mom stated that often they cannot understand her at home. Kourtney’s speech is fast and lacks articulatory precision, which affects her overall  intelligibility. Pt speech is fast and lacks articulatory precision, which affects her overall intelligibility.  She has specific lateralization with production of /s/ and /z/, which affects her intelligibility. She is also dropping syllables in multisyllabic words. She drops the middle sound. She is understood about 65% of the time in connected speech to an unfamiliar listener.  She has learned strategies to increase intelligibility including slow rate, pacing, and over articulation. She requires max cues to use strategies.  Mom indicates max cues are used at home to implement strategies. Today Kourtney tried out some oral motor exercises to strengthen her tongue. Tx focused on slowing rate and pacing at the syllable or word level in connected speech.  10 functional phrases were utilized. Kourtney easily completed this session. However, today she said her functional phrases beautifully and nice and clear and needed no cueing. Mom is encouraging use of pacing at home during the day. She has begun to notice a difference in Kourtney's speech at home. In conversation, Kourtney still talks super-fast and needs max cueing to slow down and over articulate. She needed this reminded 15X in conversation today. She even has a list of strategies in front of her. In conversation, Kourtney still talks super-fast and needs max cueing to slow down and over articulate. She still has a hard time with multisyllabic. If she slows down and paces herself, it is ok. She was given the expressive and receptive one word tests. She received a standard score on both of 75. Our goals is a 100 or better. Kourtney will continue to benefit from speech therapy to address education and implementation of compensatory strategies to improve intelligibility.  -AL    Please refer to paper survey for additional self-reported information Yes  -AL    Please refer to items scanned into chart for additional diagnostic informaiton and handouts as provided by  clinician Yes  -AL    Prognosis Fair (comment)  -AL    Patient/caregiver participated in establishment of treatment plan and goals Yes  -AL    Patient would benefit from skilled therapy intervention Yes  -AL       SLP Plan    Frequency 1x per week  -AL    Duration 8/20  -AL    Planned CPT's? SLP INDIVIDUAL SPEECH THERAPY: 94411  -AL    Expected Duration of Therapy Session (SLP Eval) 45  -AL    Plan Comments Try out new pacing and intellgibility strategies. The carryover from session to session and at home is not great.  -AL          User Key  (r) = Recorded By, (t) = Taken By, (c) = Cosigned By    Initials Name Provider Type    AL Vida Clement, SLP Speech and Language Pathologist                 SLP SLC Evaluation - 04/11/23 1053        Motor Speech Assessment/Intervention    Motor Speech Function severe impairment;unfamiliar listener;familiar listener  -AL    Characteristics Consistent with Dysarthria increased rate;decreased articulation;decreased prosody;decreased breath support;other (see comments)   Laterization of her sounds. (Slushy speech). -AL    Characteristics Consistent with Apraxia substitutions;inconsistent errors  -AL    Automatic Speech (Communication) moderate impairment  -AL    Verbal Repetition (Communication) severe impairment;polysyllabic words;words of increasing length;phrases;sentences  -AL    Phase Completion unpredictable  -AL    Conversational Speech (Communication) moderate impairment  -AL    Speech intelligibility 40%;in quiet environment;in connected speech;with unfamiliar listener  -AL       SLP Evaluation Clinical Impressions    SLP Diagnosis moderate-severe;dysarthria;communication disorder   articulation disorder -AL    Rehab Potential/Prognosis guarded  -AL    SLC Criteria for Skilled Therapy Interventions Met yes  -AL    Functional Impact functional impact in social situations;difficulty communicating in an emergency;restrictions in personal and social life  -AL        Recommendations    Therapy Frequency (SLP SLC) 1 day per week  -AL    Communication Strategy Suggestions eliminate distractions when speaking with patient;avoid open-ended questions;other (see comments)   encourage slow rate/pacing, over articulation of sounds. -AL          User Key  (r) = Recorded By, (t) = Taken By, (c) = Cosigned By    Initials Name Provider Type    Vida Tejada, SLP Speech and Language Pathologist               SLP Adult Swallow Evaluation     Row Name 04/11/23 8104       Rehab Evaluation    Document Type re-evaluation;progress note/Recertification  -AL    Subjective Information no complaints  -AL    Patient Observations alert;cooperative;agree to therapy  -AL    Patient/Family/Caregiver Comments/Observations attended independently with Mom in parking lot  -AL    Patient Effort adequate  -AL    Comment pt less talkative today and does not want to use pacing techniques. Mom warned that the Pt was not in a great mood. She kept saying I am just tired.  -AL       General Information    Patient Profile Reviewed yes  -AL    Pertinent History Of Current Problem pt is verbose with run on conversations.  she demonstrates dysarthria with decreased articulatory precision, rate, prosody, and breath support.  she also has a mild tongue lateralization in conversational speech.  -AL    Precautions/Limitations, Vision WFL;for purposes of Eval  -AL    Precautions/Limitations, Hearing WFL  -AL    Plans/Goals Discussed with patient  -AL    Barriers to Rehab cognitive status;previous functional deficit  -AL       Pain Scale: Numbers Pre/Post-Treatment    Pretreatment Pain Rating 0/10 - no pain  -AL    Posttreatment Pain Rating 0/10 - no pain  -AL       Oral Motor Structure and Function    Oral Lesions or Structural Abnormalities and/or variants down syndrome characteristics of small mouth, large tongue, small legions on the tongue, imprecise articulation  -AL    Dentition Assessment natural, present and  adequate;other (see comments)  Space between front teeth which could cause lateralization  -AL    Mucosal Quality moist, healthy  -AL       Oral Musculature and Cranial Nerve Assessment    Oral Motor General Assessment lingual impairment;other (see comments)  Pt has space a pretty large gap between her teeth. This is what is causing the slushy sound.  -AL    Lingual Impairment, Detail. Cranial Nerves IX, XII (Glossopharyngeal and Hypoglossal) reduced strength;bilaterally;reduced lingual ROM  -AL       SLP Treatment Clinical Impressions    Treatment Assessment (SLP) continued;dysarthria;communication disorder  -AL    Treatment Assessment Comments (SLP) Pt needs education to complete the HTP. She does not want to complete it.  -AL    Daily Summary of Progress (SLP) progress toward functional goals is gradual  -AL    Barriers to Overall Progress (SLP) Resistant to information  -AL    Plan for Continued Treatment (SLP) continue treatment per plan of care  -AL    Care Plan Review care plan/treatment goals reviewed;evaluation/treatment results reviewed;risks/benefits reviewed;current/potential barriers reviewed;patient/other agree to care plan  -AL    Care Plan Review, Other Participant(s) mother  -AL       Recommendations    Predicted Duration Therapy Intervention (Days) until discharge  -AL    Anticipated Discharge Disposition (SLP) home with assist  -AL          User Key  (r) = Recorded By, (t) = Taken By, (c) = Cosigned By    Initials Name Provider Type    Vida Tejada SLP Speech and Language Pathologist                              OP SLP Education     Row Name 04/11/23 1053       Education    Barriers to Learning Decreased comprehension  -AL    Action Taken to Address Barriers education with mother for HEP  -AL    Education Provided Patient demonstrated recommended strategies;Family/caregivers demonstrated recommended strategies;Patient requires further education on strategies, risks;Family/caregivers require  "further education on strategies, risks  -AL    Assessed Learning needs;Learning motivation;Learning readiness;Learning preferences  -AL    Learning Motivation Moderate  -AL    Learning Method Explanation;Demonstration  -AL    Teaching Response Verbalized understanding;Demonstrated understanding  -AL    Education Comments Mom was given HTP papers with Kourtney's strategies to be utilized at home. The strategies from week to week are said being utilized at home, but there is no carryover between session.  -AL          User Key  (r) = Recorded By, (t) = Taken By, (c) = Cosigned By    Initials Name Effective Dates    AL Vida Clement, SLP 09/22/22 -                SLP OP Goals     Row Name 04/11/23 1052          Goal Type Needed    Goal Type Needed Other Adult Goals;Dysarthria  -AL        Subjective Comments    Subjective Comments Pt was frustrated with the clinician today. She did not want to participate in the assessments. She kept trying to close her eyes and try to fall asleep.  -AL        Subjective Pain    Able to rate subjective pain? yes  -AL     Pre-Treatment Pain Level 0  -AL     Post-Treatment Pain Level 0  -AL     Subjective Pain Comment \"I feel fine. I am tired.\"  -AL        Dysarthria Goals     Dysarthria LTG's Patient will be able to communicate a message that is comprehensible to familiar/unfamiliar listeners utilizing verbal speech and augmentative strategies  -AL     Patient will be able to communicate a message that is comprehensible to familiar/unfamiliar listeners utilizing verbal speech and augmentative strategies 80%:  -AL     Status: Patient will be able to communicate a message that is comprehensible to familiar/unfamiliar listeners utilizing verbal speech and augmentative strategies Progress slower than expected  -AL     Comments: Patient will be able to communicate a message that is comprehensible to familiar/unfamiliar listeners utilizing verbal speech and augmentative strategies Kourtney" still is very unintelligible when talking in conversation. Kourtney still is very unintelligible when talking in conversation. Mom stated that often they cannot understand her at home. Kourtney’s speech is fast and lacks articulatory precision, which affects her overall intelligibility. Pt speech is fast and lacks articulatory precision, which affects her overall intelligibility.  She has specific lateralization with production of /s/ and /z/, which affects her intelligibility. She is also dropping syllables in multisyllabic words. She drops the middle sound. She is understood about 65% of the time in connected speech to an unfamiliar listener.  She has learned strategies to increase intelligibility including slow rate, pacing, and over articulation. She requires max cues to use strategies.  Mom indicates max cues are used at home to implement strategies.  -AL      Dysarthria STG's Patient will apply compensatory strategies to improve intelligibility of speech during in spontaneous speech  -AL     Patient will apply compensatory strategies to improve intelligibility of speech during in spontaneous speech 80%:  -AL     Status: Patient will apply compensatory strategies to improve intelligibility of speech during in spontaneous speech Progress slower than expected  -AL     Comments: Patient will apply compensatory strategies to improve intelligibility of speech during in spontaneous speech Today Kourtney tried out some oral motor exercises to strengthen her tongue. Tx focused on slowing rate and pacing at the syllable or word level in connected speech.  10 functional phrases were utilized. Kourtney easily completed this session. However, today she said her functional phrases beautifully and nice and clear and needed no cueing.  -AL        Other Goals    Other Adult Goal- 1 Mom will encourage HEP to facilitate carryover and report back weekly on progress  -AL     Status: Other Adult Goal- 1 Progress slower than  expected  -AL     Comments: Other Adult Goal- 1 Mom is encouraging use of pacing at home during the day. She has begun to notice a difference in Kourtney's speech at home. In conversation, Kourtney still talks super-fast and needs max cueing to slow down and over articulate. She needed this reminded 15X in conversation today. She even has a list of strategies in front of her.  -AL     Other Adult Goal- 2 Pt will eliminate lateralization during production of /s/ and /s/ blends.  -AL     Status: Other Adult Goal- 2 Progress slower than expected  -AL     Comments: Other Adult Goal- 2 In conversation, Kourtney still talks super-fast and needs max cueing to slow down and over articulate.  -AL     Other Adult Goal- 3 In order to improve intellgibility , Kourtney will produce 3 syllable words in isolation with 80% accuracy.  -AL     Status: Other Adult Goal- 3 Progress slower than expected  -AL     Comments: Other Adult Goal- 3 She still has a hard time with multisyllabic. If she slows down and paces herself it is ok.  -AL        SLP Time Calculation    SLP Goal Re-Cert Due Date 05/11/23  -AL           User Key  (r) = Recorded By, (t) = Taken By, (c) = Cosigned By    Initials Name Provider Type    Vida Tejada SLP Speech and Language Pathologist                     Time Calculation:   SLP Start Time: 1053  SLP Stop Time: 1146  SLP Time Calculation (min): 53 min  Untimed Charges  55664-OB Treatment/ST Modification Prosth Aug Alter : 53  Total Minutes  Untimed Charges Total Minutes: 53   Total Minutes: 53    Therapy Charges for Today     Code Description Service Date Service Provider Modifiers Qty    [id number]  ST TREATMENT SPEECH 4 4/11/2023 Vida Clement, JONO GN 1                   Vida Clement [facility] SLP  4/11/2023

## 2023-04-18 ENCOUNTER — HOSPITAL ENCOUNTER (OUTPATIENT)
Dept: SPEECH THERAPY | Facility: HOSPITAL | Age: 23
Setting detail: THERAPIES SERIES
Discharge: HOME | End: 2023-04-18
Payer: MEDICAID

## 2023-04-18 DIAGNOSIS — R47.1 DYSARTHRIA: Primary | ICD-10-CM

## 2023-04-18 DIAGNOSIS — Q90.9 DOWN SYNDROME: ICD-10-CM

## 2023-04-18 PROCEDURE — 92507 TX SP LANG VOICE COMM INDIV: CPT

## 2023-04-18 NOTE — THERAPY TREATMENT NOTE
Outpatient Speech Language Pathology   Peds Speech Language Treatment Note  [facility]     Patient Name: Kourtney Townsend  : 2000  MRN: [id number]  Today's Date: 2023      Visit Date: 2023    There is no problem list on file for this patient.      Visit Dx:    ICD-10-CM ICD-9-CM   1. Dysarthria  R47.1 784.51   2. Down syndrome  Q90.9 758.0        OP SLP Assessment/Plan - 23 1050        SLP Assessment    Functional Problems Speech Language- Adult/Cognition  -AL    Impact on Function: Adult Speech Language/Cognition Difficulty communicating wants, needs, and ideas;Difficulty communicating in a medical emergency;Restrictions in personal and social life;Lack of insight or awareness of deficits, safety issues  -AL    Clinical Impression: Speech Language-Adult/Congnition Moderate-Severe:;Other (comment)   Dysarthria -AL    Functional Problems Comment Kourtney has decresed intelligibility that is associated with her down syndrome dx.  Her dx of ADHD also affects her ability to utilize compensatory strategies to improve her intelligibility.  -AL    Clinical Impression Comments Kourtney arrived to speech therapy energetic and in a good mood. Pt was in good spirits today. Therapy to focus on building her articulation skills to increase intelligibility to continue to build the skills and foundation so unfamiliar listeners can understand her. Kourtney still is very unintelligible when talking in conversation. Mom stated that often they cannot understand her at home. Kourtney’s speech is fast and lacks articulatory precision, which affects her overall intelligibility. Pt speech is fast and lacks articulatory precision, which affects her overall intelligibility.  She has specific lateralization with production of /s/ and /z/, which affects her intelligibility. She is also dropping syllables in multisyllabic words. She drops the middle sound. She is understood about 60% of the time in connected  speech to an unfamiliar listener.  She has learned strategies to increase intelligibility including slow rate, pacing, and over articulation. She requires max cues to use strategies.  Mom indicates max cues are used at home to implement strategies. Today Kourtney tried out some oral motor exercises to strengthen her tongue. Tx focused on slowing rate and pacing at the syllable or word level in connected speech.  10 functional phrases were utilized. Kourtney easily completed this session. However, today she said her functional phrases very unintelligible with multiple reminders to be clear. Mom is encouraging use of pacing at home during the day. She has begun to notice a difference in Kourtney's speech at home. In conversation, Kourtney still talks super-fast and needs max cueing to slow down and over articulate. She needed this reminded 10X in conversation today. She even has a list of strategies in front of her.  In conversation, Kourtney still talks super-fast and needs max cueing to slow down and over articulate. She produced /s/ and /z/ with 55% accuracy with mod cues. Today with her 3 syllable words, she produced them with 65% accuracy with mod cues. Kourtney will continue to benefit from speech therapy to address education and implementation of compensatory strategies to improve intelligibility.  -AL    Please refer to paper survey for additional self-reported information Yes  -AL    Please refer to items scanned into chart for additional diagnostic informaiton and handouts as provided by clinician Yes  -AL    Prognosis Fair (comment)  -AL    Patient/caregiver participated in establishment of treatment plan and goals Yes  -AL    Patient would benefit from skilled therapy intervention Yes  -AL       SLP Plan    Frequency 1x per week  -AL    Duration 9/20  -AL    Planned CPT's? SLP INDIVIDUAL SPEECH THERAPY: 06819  -AL    Expected Duration of Therapy Session (SLP Eval) 45  -AL    Plan Comments Try out new  "pacing and intellgibility strategies. The carryover from session to session and at home is not great.  -AL          User Key  (r) = Recorded By, (t) = Taken By, (c) = Cosigned By    Initials Name Provider Type    Vida Tejada, SLP Speech and Language Pathologist                                 SLP OP Goals     Row Name 04/18/23 1050          Goal Type Needed    Goal Type Needed Other Adult Goals;Dysarthria  -AL        Subjective Comments    Subjective Comments Pt was in good spirits, but did not want to work today. Grandma accompanied her to the session, but remained in the lobby.  -AL        Subjective Pain    Able to rate subjective pain? yes  -AL     Pre-Treatment Pain Level 0  -AL     Post-Treatment Pain Level 0  -AL     Subjective Pain Comment \"I am fine.\"  -AL        Dysarthria Goals    Patient will be able to communicate a message that is comprehensible to familiar/unfamiliar listeners utilizing verbal speech and augmentative strategies 80%:  -AL     Status: Patient will be able to communicate a message that is comprehensible to familiar/unfamiliar listeners utilizing verbal speech and augmentative strategies Progress slower than expected  -AL     Comments: Patient will be able to communicate a message that is comprehensible to familiar/unfamiliar listeners utilizing verbal speech and augmentative strategies Kourtney still is very unintelligible when talking in conversation. Mom stated that often they cannot understand her at home. Kourtney’s speech is fast and lacks articulatory precision, which affects her overall intelligibility. Pt speech is fast and lacks articulatory precision, which affects her overall intelligibility.  She has specific lateralization with production of /s/ and /z/, which affects her intelligibility. She is also dropping syllables in multisyllabic words. She drops the middle sound. She is understood about 60% of the time in connected speech to an unfamiliar listener.  She has " learned strategies to increase intelligibility including slow rate, pacing, and over articulation. She requires max cues to use strategies.  Mom indicates max cues are used at home to implement strategies.  -AL     Patient will apply compensatory strategies to improve intelligibility of speech during in spontaneous speech 80%:  -AL     Status: Patient will apply compensatory strategies to improve intelligibility of speech during in spontaneous speech Progress slower than expected  -AL     Comments: Patient will apply compensatory strategies to improve intelligibility of speech during in spontaneous speech Today Kourtney tried out some oral motor exercises to strengthen her tongue. Tx focused on slowing rate and pacing at the syllable or word level in connected speech.  10 functional phrases were utilized. Kourtney easily completed this session. However, today she said her functional phrases very unintelligible with multiple reminders to be clear,.  -AL        Other Goals    Other Adult Goal- 1 Mom will encourage HEP to facilitate carryover and report back weekly on progress  -AL     Status: Other Adult Goal- 1 Progress slower than expected  -AL     Comments: Other Adult Goal- 1 Mom is encouraging use of pacing at home during the day. She has begun to notice a difference in Kourtney's speech at home. In conversation, Kourtney still talks super-fast and needs max cueing to slow down and over articulate. She needed this reminded 10X in conversation today. She even has a list of strategies in front of her.  -AL     Other Adult Goal- 2 Pt will eliminate lateralization during production of /s/ and /s/ blends.  -AL     Status: Other Adult Goal- 2 Progress slower than expected  -AL     Comments: Other Adult Goal- 2 In conversation, Kourtney still talks super-fast and needs max cueing to slow down and over articulate. She produced /s/ and /z/ with 55% accuracy with mod cues.  -AL     Other Adult Goal- 3 In order to improve  intellgibility , Kourtney will produce 3 syllable words in isolation with 80% accuracy.  -AL     Status: Other Adult Goal- 3 Progress slower than expected  -AL     Comments: Other Adult Goal- 3 Today with her 3 syllable words, she produced them with 65% accuracy with mod cues.  -AL        SLP Time Calculation    SLP Goal Re-Cert Due Date 05/11/23  -AL           User Key  (r) = Recorded By, (t) = Taken By, (c) = Cosigned By    Initials Name Provider Type    Vida Tejada SLP Speech and Language Pathologist               OP SLP Education     Row Name 04/18/23 1050       Education    Barriers to Learning Decreased comprehension  -AL    Action Taken to Address Barriers education with mother for HEP  -AL    Education Provided Patient demonstrated recommended strategies;Family/caregivers demonstrated recommended strategies;Patient requires further education on strategies, risks;Family/caregivers require further education on strategies, risks  -AL    Assessed Learning needs;Learning motivation;Learning readiness;Learning preferences  -AL    Learning Motivation Moderate  -AL    Learning Method Explanation;Demonstration  -AL    Teaching Response Verbalized understanding;Demonstrated understanding  -AL    Education Comments Mom was given P papers with Kourtney's strategies to be utilized at home. The strategies from week to week are said being utilized at home, but there is no carryover between session.  -AL          User Key  (r) = Recorded By, (t) = Taken By, (c) = Cosigned By    Initials Name Effective Dates    Vida Tejada SLP 09/22/22 -                    Time Calculation:   SLP Start Time: 1050  SLP Stop Time: 1143  SLP Time Calculation (min): 53 min  Untimed Charges  60008-JC Treatment/ST Modification Prosth Aug Alter : 53  Total Minutes  Untimed Charges Total Minutes: 53   Total Minutes: 53    Therapy Charges for Today     Code Description Service Date Service Provider Modifiers Qty    [id number]  ST  TREATMENT SPEECH 4 4/18/2023 Vida Clement, SLP GN 1                     BALDEMAR Keith. [facility] SLP  4/18/2023

## 2023-04-25 ENCOUNTER — HOSPITAL ENCOUNTER (OUTPATIENT)
Dept: SPEECH THERAPY | Facility: HOSPITAL | Age: 23
Setting detail: THERAPIES SERIES
Discharge: HOME | End: 2023-04-25
Payer: MEDICAID

## 2023-04-25 DIAGNOSIS — Q90.9 DOWN SYNDROME: ICD-10-CM

## 2023-04-25 DIAGNOSIS — R47.1 DYSARTHRIA: Primary | ICD-10-CM

## 2023-04-25 PROCEDURE — 92507 TX SP LANG VOICE COMM INDIV: CPT

## 2023-04-25 NOTE — THERAPY TREATMENT NOTE
Outpatient Speech Language Pathology   Peds Speech Language Treatment Note  [facility]     Patient Name: Kourtney Townsend  : 2000  MRN: [id number]  Today's Date: 2023      Visit Date: 2023    There is no problem list on file for this patient.      Visit Dx:    ICD-10-CM ICD-9-CM   1. Dysarthria  R47.1 784.51   2. Down syndrome  Q90.9 758.0        OP SLP Assessment/Plan - 23 1057        SLP Assessment    Functional Problems Speech Language- Adult/Cognition  -AL    Impact on Function: Adult Speech Language/Cognition Difficulty communicating wants, needs, and ideas;Difficulty communicating in a medical emergency;Restrictions in personal and social life;Lack of insight or awareness of deficits, safety issues  -AL    Clinical Impression: Speech Language-Adult/Congnition Moderate-Severe:;Other (comment)   Dysarthria -AL    Functional Problems Comment Kourtney has decresed intelligibility that is associated with her down syndrome dx.  Her dx of ADHD also affects her ability to utilize compensatory strategies to improve her intelligibility.  -AL    Clinical Impression Comments Kourtney arrived to speech therapy energetic and in a good mood. Pt was in good spirits today. Therapy to focus on building her articulation skills to increase intelligibility to continue to build the skills and foundation so unfamiliar listeners can understand her. Kourtney still is very unintelligible when talking in conversation. Mom stated that often they cannot understand her at home. Kourtney’s speech is fast and lacks articulatory precision, which affects her overall intelligibility. Pt speech is fast and lacks articulatory precision, which affects her overall intelligibility.  She has specific lateralization with production of /s/ and /z/, which affects her intelligibility. She is also dropping syllables in multisyllabic words. She drops the middle sound. She is understood about 65% of the time in connected  speech to an unfamiliar listener.  She has learned strategies to increase intelligibility including slow rate, pacing, and over articulation. She requires max cues to use strategies.  Mom indicates max cues are used at home to implement strategies. Today Kourtney tried out some oral motor exercises to strengthen her tongue. Tx focused on slowing rate and pacing at the syllable or word level in connected speech.  10 functional phrases were utilized. Kourtney easily completed this session. However, today she said her functional phrases very unintelligible with multiple reminders to be clear. Mom is encouraging use of pacing at home during the day. She has begun to notice a difference in Kourtney's speech at home. In conversation, Kourtney still talks super-fast and needs max cueing to slow down and over articulate. She needed this reminded 15X in conversation today. She even has a list of strategies in front of her.  In conversation, Kourtney still talks super-fast and needs max cueing to slow down and over articulate. She produced /s/ and /z/ with 65% accuracy with mod cues. Today with her 3 syllable words, she produced them with 70% accuracy with mod cues. Kourtney will continue to benefit from speech therapy to address education and implementation of compensatory strategies to improve intelligibility.  -AL    Please refer to paper survey for additional self-reported information Yes  -AL    Please refer to items scanned into chart for additional diagnostic informaiton and handouts as provided by clinician Yes  -AL    Prognosis Fair (comment)  -AL    Patient/caregiver participated in establishment of treatment plan and goals Yes  -AL    Patient would benefit from skilled therapy intervention Yes  -AL       SLP Plan    Frequency 1x per week  -AL    Duration 10/20  -AL    Planned CPT's? SLP INDIVIDUAL SPEECH THERAPY: 16208  -AL    Expected Duration of Therapy Session (SLP Eval) 45  -AL    Plan Comments Try out new  "pacing and intellgibility strategies. The carryover from session to session and at home is not great.  -AL          User Key  (r) = Recorded By, (t) = Taken By, (c) = Cosigned By    Initials Name Provider Type    Vida Tejada SLP Speech and Language Pathologist                                 SLP OP Goals     Row Name 04/25/23 1057          Goal Type Needed    Goal Type Needed Other Adult Goals;Dysarthria  -AL        Subjective Comments    Subjective Comments Pt's mom informed me that she gave her adderall , but the clinician did not notice a difference with her attention.  -AL        Subjective Pain    Able to rate subjective pain? yes  -AL     Pre-Treatment Pain Level 0  -AL     Post-Treatment Pain Level 0  -AL     Subjective Pain Comment \"I am tired.\"  -AL        Dysarthria Goals     Dysarthria LTG's Patient will be able to communicate a message that is comprehensible to familiar/unfamiliar listeners utilizing verbal speech and augmentative strategies  -AL     Patient will be able to communicate a message that is comprehensible to familiar/unfamiliar listeners utilizing verbal speech and augmentative strategies 80%:  -AL     Status: Patient will be able to communicate a message that is comprehensible to familiar/unfamiliar listeners utilizing verbal speech and augmentative strategies Progress slower than expected  -AL     Comments: Patient will be able to communicate a message that is comprehensible to familiar/unfamiliar listeners utilizing verbal speech and augmentative strategies Kourtney still is very unintelligible when talking in conversation. Mom stated that often they cannot understand her at home. Kourtney’s speech is fast and lacks articulatory precision, which affects her overall intelligibility. Pt speech is fast and lacks articulatory precision, which affects her overall intelligibility.  She has specific lateralization with production of /s/ and /z/, which affects her intelligibility. She is " also dropping syllables in multisyllabic words. She drops the middle sound. She is understood about 65% of the time in connected speech to an unfamiliar listener.  She has learned strategies to increase intelligibility including slow rate, pacing, and over articulation. She requires max cues to use strategies.  Mom indicates max cues are used at home to implement strategies.  -AL      Dysarthria STG's Patient will apply compensatory strategies to improve intelligibility of speech during in spontaneous speech  -AL     Patient will apply compensatory strategies to improve intelligibility of speech during in spontaneous speech 80%:  -AL     Status: Patient will apply compensatory strategies to improve intelligibility of speech during in spontaneous speech Progress slower than expected  -AL     Comments: Patient will apply compensatory strategies to improve intelligibility of speech during in spontaneous speech Today Kourtney tried out some oral motor exercises to strengthen her tongue. Tx focused on slowing rate and pacing at the syllable or word level in connected speech.  10 functional phrases were utilized. Kourtney easily completed this session. However, today she said her functional phrases very unintelligible with multiple reminders to be clear,.  -AL        Other Goals    Other Adult Goal- 1 Mom will encourage HEP to facilitate carryover and report back weekly on progress  -AL     Status: Other Adult Goal- 1 Progress slower than expected  -AL     Comments: Other Adult Goal- 1 Mom is encouraging use of pacing at home during the day. She has begun to notice a difference in Kourtney's speech at home. In conversation, Kourtney still talks super-fast and needs max cueing to slow down and over articulate. She needed this reminded 15X in conversation today. She even has a list of strategies in front of her.  -AL     Other Adult Goal- 2 Pt will eliminate lateralization during production of /s/ and /s/ blends.  -AL      Status: Other Adult Goal- 2 Progress slower than expected  -AL     Comments: Other Adult Goal- 2 In conversation, Kourtney still talks super-fast and needs max cueing to slow down and over articulate. She produced /s/ and /z/ with 65% accuracy with mod cues.  -AL     Other Adult Goal- 3 In order to improve intellgibility , Kourtney will produce 3 syllable words in isolation with 80% accuracy.  -AL     Status: Other Adult Goal- 3 Progress slower than expected  -AL     Comments: Other Adult Goal- 3 Today with her 3 syllable words, she produced them with 70% accuracy with mod cues.  -AL        SLP Time Calculation    SLP Goal Re-Cert Due Date 05/11/23  -AL           User Key  (r) = Recorded By, (t) = Taken By, (c) = Cosigned By    Initials Name Provider Type    Vida Tejada, SLP Speech and Language Pathologist               OP SLP Education     Row Name 04/25/23 1057       Education    Barriers to Learning Decreased comprehension  -AL    Action Taken to Address Barriers education with mother for HEP  -AL    Education Provided Patient demonstrated recommended strategies;Family/caregivers demonstrated recommended strategies;Patient requires further education on strategies, risks;Family/caregivers require further education on strategies, risks  -AL    Assessed Learning needs;Learning motivation;Learning readiness;Learning preferences  -AL    Learning Motivation Moderate  -AL    Learning Method Explanation;Demonstration  -AL    Teaching Response Verbalized understanding;Demonstrated understanding  -AL    Education Comments Mom was given P papers with Kourtney's strategies to be utilized at home. The strategies from week to week are said being utilized at home, but there is no carryover between session.  -AL          User Key  (r) = Recorded By, (t) = Taken By, (c) = Cosigned By    Initials Name Effective Dates    Vida Tejada SLP 09/22/22 -                    Time Calculation:   SLP Start Time: 1057  SLP  Stop Time: 1140  SLP Time Calculation (min): 43 min  Untimed Charges  [id number] Treatment/ST Modification Prosth Aug Alter : 43  Total Minutes  Untimed Charges Total Minutes: 43   Total Minutes: 43    Therapy Charges for Today     Code Description Service Date Service Provider Modifiers Qty    [id number]  ST TREATMENT SPEECH 3 4/25/2023 Vida Clement, SLP GN 1                     Vida Clement M.S. [facility] SLP  4/25/2023

## 2023-05-02 ENCOUNTER — HOSPITAL ENCOUNTER (OUTPATIENT)
Dept: SPEECH THERAPY | Facility: HOSPITAL | Age: 23
Setting detail: THERAPIES SERIES
Discharge: HOME | End: 2023-05-02
Payer: MEDICAID

## 2023-05-02 DIAGNOSIS — Q90.9 DOWN SYNDROME: ICD-10-CM

## 2023-05-02 DIAGNOSIS — R47.1 DYSARTHRIA: Primary | ICD-10-CM

## 2023-05-02 PROCEDURE — 92507 TX SP LANG VOICE COMM INDIV: CPT

## 2023-05-02 NOTE — THERAPY TREATMENT NOTE
Outpatient Speech Language Pathology   Peds Speech Language Treatment Note  [facility]     Patient Name: Kourtney Townsend  : 2000  MRN: [id number]  Today's Date: 2023      Visit Date: 2023    There is no problem list on file for this patient.      Visit Dx:    ICD-10-CM ICD-9-CM   1. Dysarthria  R47.1 784.51   2. Down syndrome  Q90.9 758.0        OP SLP Assessment/Plan - 23 1059        SLP Assessment    Functional Problems Speech Language- Adult/Cognition  -AL    Impact on Function: Adult Speech Language/Cognition Difficulty communicating wants, needs, and ideas;Difficulty communicating in a medical emergency;Restrictions in personal and social life;Lack of insight or awareness of deficits, safety issues  -AL    Clinical Impression: Speech Language-Adult/Congnition Moderate-Severe:;Other (comment)   Dysarthria -AL    Functional Problems Comment Kourtney has decresed intelligibility that is associated with her down syndrome dx.  Her dx of ADHD also affects her ability to utilize compensatory strategies to improve her intelligibility.  -AL    Clinical Impression Comments Kourtney arrived to speech therapy energetic and in a good mood. Pt was in good spirits today. Therapy to focus on building her articulation skills to increase intelligibility to continue to build the skills and foundation so unfamiliar listeners can understand her. Kourtney still is very unintelligible when talking in conversation. Mom stated that often they cannot understand her at home. Kourtney’s speech is fast and lacks articulatory precision, which affects her overall intelligibility. Pt speech is fast and lacks articulatory precision, which affects her overall intelligibility.  She has specific lateralization with production of /s/ and /z/, which affects her intelligibility. She is also dropping syllables in multisyllabic words. She drops the middle sound. She is understood about 60% of the time in connected speech  to an unfamiliar listener.  She has learned strategies to increase intelligibility including slow rate, pacing, and over articulation. She requires max cues to use strategies.  Mom indicates max cues are used at home to implement strategies. Today Kourtney tried out some oral motor exercises to strengthen her tongue. Tx focused on slowing rate and pacing at the syllable or word level in connected speech.  10 functional phrases were utilized. Kourtney easily completed this session. However, today she said her functional phrases very unintelligible with multiple reminders to be clear. In conversation, Kourtney still talks super-fast and needs max cueing to slow down and over articulate. She produced /s/ and /z/ with 70% accuracy with mod cues. We are also going to target /th/, /r blends/,and /Ch/. Today with her 3 syllable words, she produced them with 70% accuracy with mod cues. Mom is encouraging use of pacing at home during the day. She has begun to notice a difference in Kourtney's speech at home. In conversation, Kourtney still talks super-fast and needs max cueing to slow down and over articulate. She needed this reminded 13X in conversation today. She even has a list of strategies in front of her.Kourtney will continue to benefit from speech therapy to address education and implementation of compensatory strategies to improve intelligibility.  -AL    Please refer to paper survey for additional self-reported information Yes  -AL    Please refer to items scanned into chart for additional diagnostic informaiton and handouts as provided by clinician Yes  -AL    Prognosis Fair (comment)  -AL    Patient/caregiver participated in establishment of treatment plan and goals Yes  -AL    Patient would benefit from skilled therapy intervention Yes  -AL       SLP Plan    Frequency 1x per week  -AL    Duration 11/20  -AL    Planned CPT's? SLP INDIVIDUAL SPEECH THERAPY: 32169  -AL    Expected Duration of Therapy Session (SLP  "Eval) 45  -AL    Plan Comments Try out new pacing and intellgibility strategies. The carryover from session to session and at home is not great.  -AL          User Key  (r) = Recorded By, (t) = Taken By, (c) = Cosigned By    Initials Name Provider Type    Vida Tejada SLP Speech and Language Pathologist                                 SLP OP Goals     Row Name 05/02/23 1059          Goal Type Needed    Goal Type Needed Other Adult Goals;Dysarthria  -AL        Subjective Comments    Subjective Comments Pt was in good spirits today and worked hard for the clinician.  -AL        Subjective Pain    Able to rate subjective pain? yes  -AL     Pre-Treatment Pain Level 0  -AL     Post-Treatment Pain Level 0  -AL     Subjective Pain Comment \"I feel fine.\"  -AL        Dysarthria Goals    Patient will be able to communicate a message that is comprehensible to familiar/unfamiliar listeners utilizing verbal speech and augmentative strategies 80%:  -AL     Status: Patient will be able to communicate a message that is comprehensible to familiar/unfamiliar listeners utilizing verbal speech and augmentative strategies Progress slower than expected  -AL     Comments: Patient will be able to communicate a message that is comprehensible to familiar/unfamiliar listeners utilizing verbal speech and augmentative strategies Kourtney still is very unintelligible when talking in conversation. Mom stated that often they cannot understand her at home. Kourtney’s speech is fast and lacks articulatory precision, which affects her overall intelligibility. Pt speech is fast and lacks articulatory precision, which affects her overall intelligibility.  She has specific lateralization with production of /s/ and /z/, which affects her intelligibility. She is also dropping syllables in multisyllabic words. She drops the middle sound. She is understood about 60% of the time in connected speech to an unfamiliar listener.  She has learned " strategies to increase intelligibility including slow rate, pacing, and over articulation. She requires max cues to use strategies.  Mom indicates max cues are used at home to implement strategies.  -AL     Patient will apply compensatory strategies to improve intelligibility of speech during in spontaneous speech 80%:  -AL     Status: Patient will apply compensatory strategies to improve intelligibility of speech during in spontaneous speech Progress slower than expected  -AL     Comments: Patient will apply compensatory strategies to improve intelligibility of speech during in spontaneous speech Today Kourtney tried out some oral motor exercises to strengthen her tongue. Tx focused on slowing rate and pacing at the syllable or word level in connected speech.  10 functional phrases were utilized. Kourtney easily completed this session. However, today she said her functional phrases very unintelligible with multiple reminders to be clear,.  -AL        Other Goals    Other Adult Goal- 1 Mom will encourage HEP to facilitate carryover and report back weekly on progress  -AL     Status: Other Adult Goal- 1 Progress slower than expected  -AL     Comments: Other Adult Goal- 1 Mom is encouraging use of pacing at home during the day. She has begun to notice a difference in Kourtney's speech at home. In conversation, Kourtney still talks super-fast and needs max cueing to slow down and over articulate. She needed this reminded 13X in conversation today. She even has a list of strategies in front of her.  -AL     Other Adult Goal- 2 Pt will eliminate lateralization during production of /s/ and /s/ blends.  -AL     Status: Other Adult Goal- 2 Progress slower than expected  -AL     Comments: Other Adult Goal- 2 In conversation, Kourtney still talks super-fast and needs max cueing to slow down and over articulate. She produced /s/ and /z/ with 70% accuracy with mod cues. We are also going to target /th/, /r blends/,and /Ch/.   -AL     Other Adult Goal- 3 In order to improve intellgibility , Kourtney will produce 3 syllable words in isolation with 80% accuracy.  -AL     Status: Other Adult Goal- 3 Progress slower than expected  -AL     Comments: Other Adult Goal- 3 Today with her 3 syllable words, she produced them with 70% accuracy with mod cues.  -AL        SLP Time Calculation    SLP Goal Re-Cert Due Date 05/11/23  -AL           User Key  (r) = Recorded By, (t) = Taken By, (c) = Cosigned By    Initials Name Provider Type    Vida Tejada SLP Speech and Language Pathologist               OP SLP Education     Row Name 05/02/23 1059       Education    Barriers to Learning Decreased comprehension  -AL    Action Taken to Address Barriers education with mother for HEP  -AL    Education Provided Patient demonstrated recommended strategies;Family/caregivers demonstrated recommended strategies;Patient requires further education on strategies, risks;Family/caregivers require further education on strategies, risks  -AL    Assessed Learning needs;Learning motivation;Learning readiness;Learning preferences  -AL    Learning Motivation Moderate  -AL    Learning Method Explanation;Demonstration  -AL    Teaching Response Verbalized understanding;Demonstrated understanding  -AL    Education Comments Mom was given HTP papers with Kourtney's strategies to be utilized at home. The strategies from week to week are said being utilized at home, but there is no carryover between session.  -AL          User Key  (r) = Recorded By, (t) = Taken By, (c) = Cosigned By    Initials Name Effective Dates    Vida Tejada SLP 09/22/22 -                    Time Calculation:   SLP Start Time: 1059  SLP Stop Time: 1146  SLP Time Calculation (min): 47 min  Untimed Charges  02116-GD Treatment/ST Modification Prosth Aug Alter : 47  Total Minutes  Untimed Charges Total Minutes: 47   Total Minutes: 47    Therapy Charges for Today     Code Description Service Date  Service Provider Modifiers Qty    [id number] [facility] TREATMENT SPEECH 3 5/2/2023 Vida Clement, SLP GN 1                     Vida Clement M.S. CCC-SLP  5/2/2023

## 2023-05-09 ENCOUNTER — HOSPITAL ENCOUNTER (OUTPATIENT)
Dept: SPEECH THERAPY | Facility: HOSPITAL | Age: 23
Setting detail: THERAPIES SERIES
Discharge: HOME | End: 2023-05-09
Payer: MEDICAID

## 2023-05-09 DIAGNOSIS — Q90.9 DOWN SYNDROME: Primary | ICD-10-CM

## 2023-05-09 DIAGNOSIS — R47.1 DYSARTHRIA: ICD-10-CM

## 2023-05-09 PROCEDURE — 92507 TX SP LANG VOICE COMM INDIV: CPT

## 2023-05-09 NOTE — THERAPY PROGRESS REPORT/RE-CERT
Outpatient Speech Language Pathology   Adult Speech Language Cognitive Progress Note  [facility]     Patient Name: Kourtney Townsend  : 2000  MRN: [id number]  Today's Date: 2023         Visit Date: 2023   There is no problem list on file for this patient.         Visit Dx:    ICD-10-CM ICD-9-CM   1. Down syndrome  Q90.9 758.0   2. Dysarthria  R47.1 784.51        OP SLP Assessment/Plan - 23 1100        SLP Assessment    Functional Problems Speech Language- Adult/Cognition  -AL    Impact on Function: Adult Speech Language/Cognition Difficulty communicating wants, needs, and ideas;Difficulty communicating in a medical emergency;Restrictions in personal and social life;Lack of insight or awareness of deficits, safety issues  -AL    Clinical Impression: Speech Language-Adult/Congnition Moderate-Severe:;Other (comment)   Dysarthria -AL    Functional Problems Comment Kourtney has decresed intelligibility that is associated with her down syndrome dx.  Her dx of ADHD also affects her ability to utilize compensatory strategies to improve her intelligibility.  -AL    Clinical Impression Comments Today is Kourtney’s 30 day progress note. She is making slow and steady progress. Kourtney arrived to speech therapy energetic and in a good mood. Pt was in good spirits today. Therapy to focus on building her articulation skills to increase intelligibility to continue to build the skills and foundation so unfamiliar listeners can understand her. Kourtney still is very unintelligible when talking in conversation. Mom stated that often they cannot understand her at home. Kourtney’s speech is fast and lacks articulatory precision, which affects her overall intelligibility. Pt speech is fast and lacks articulatory precision, which affects her overall intelligibility. She is also dropping syllables in multisyllabic words. She drops the middle sound. She is understood about 60% of the time in connected speech to  an unfamiliar listener.  She has learned strategies to increase intelligibility including slow rate, pacing, and over articulation. She requires max cues to use strategies.  Mom indicates max cues are used at home to implement strategies. Today Kourtney tried out some oral motor exercises to strengthen her tongue. Tx focused on slowing rate and pacing at the syllable or word level in connected speech.  10 functional phrases were utilized. Kourtney easily completed this session. However, today she said her functional phrases very unintelligible with multiple reminders to be clear. In conversation, Kourtney still talks super-fast and needs max cueing to slow down and over articulate. She produced /s/ and /z/ with 70% accuracy with mod cues. We targeted today /th/ today. She was able to produced /th/ in isolation with 25% accuracy with max cues. We targeted /r/ today and she was able to produce /r/ in isolation with 75% accuracy with mod cues. We are also going to target /th/, /r blends/,and /Ch/. Today with her 3 syllable words, she produced them with 70% accuracy with mod cues. Mom is encouraging use of pacing at home during the day. She has begun to notice a difference in Kourtney's speech at home. In conversation, Kourtney still talks super-fast and needs max cueing to slow down and over articulate. She needed this reminded 13X in conversation today. She even has a list of strategies in front of her. Kourtney will continue to benefit from speech therapy to address education and implementation of compensatory strategies to improve intelligibility.  -AL    Please refer to paper survey for additional self-reported information Yes  -AL    Please refer to items scanned into chart for additional diagnostic informaiton and handouts as provided by clinician Yes  -AL    Prognosis Fair (comment)  -AL    Patient/caregiver participated in establishment of treatment plan and goals Yes  -AL    Patient would benefit from skilled  "therapy intervention Yes  -AL       SLP Plan    Frequency 1x per week  -AL    Duration 12/20  -AL    Planned CPT's? SLP INDIVIDUAL SPEECH THERAPY: 42641  -AL    Expected Duration of Therapy Session (SLP [place]) 45  -AL    Plan Comments Try out new pacing and intellgibility strategies. The carryover from session to session and at home is not great.  -AL          User Key  (r) = Recorded By, (t) = Taken By, (c) = Cosigned By    Initials Name Provider Type    Vida Tejada, SLP Speech and Language Pathologist                                   SLP OP Goals     Row Name 05/09/23 1100          Goal Type Needed    Goal Type Needed Other Adult Goals;Dysarthria  -AL        Subjective Comments    Subjective Comments Pt was in good spirits today.  -AL        Subjective Pain    Able to rate subjective pain? yes  -AL     Pre-Treatment Pain Level 0  -AL     Post-Treatment Pain Level 0  -AL     Subjective Pain Comment \"I am ok.\"  -AL        Dysarthria Goals    Patient will be able to communicate a message that is comprehensible to familiar/unfamiliar listeners utilizing verbal speech and augmentative strategies 80%:  -AL     Status: Patient will be able to communicate a message that is comprehensible to familiar/unfamiliar listeners utilizing verbal speech and augmentative strategies Progress slower than expected  -AL     Comments: Patient will be able to communicate a message that is comprehensible to familiar/unfamiliar listeners utilizing verbal speech and augmentative strategies Kourtney still is very unintelligible when talking in conversation. Mom stated that often they cannot understand her at home. Kourtney’s speech is fast and lacks articulatory precision, which affects her overall intelligibility. Pt speech is fast and lacks articulatory precision, which affects her overall intelligibility.  She has specific lateralization with production of /s/ and /z/, which affects her intelligibility. She is also dropping " syllables in multisyllabic words. She drops the middle sound. She is understood about 60% of the time in connected speech to an unfamiliar listener.  She has learned strategies to increase intelligibility including slow rate, pacing, and over articulation. She requires max cues to use strategies.  Mom indicates max cues are used at home to implement strategies.  -AL     Patient will apply compensatory strategies to improve intelligibility of speech during in spontaneous speech 80%:  -AL     Status: Patient will apply compensatory strategies to improve intelligibility of speech during in spontaneous speech Progress slower than expected  -AL     Comments: Patient will apply compensatory strategies to improve intelligibility of speech during in spontaneous speech Today Kourtney tried out some oral motor exercises to strengthen her tongue. Tx focused on slowing rate and pacing at the syllable or word level in connected speech.  10 functional phrases were utilized. Kourtney easily completed this session. However, today she said her functional phrases very unintelligible with multiple reminders to be clear,.  -AL        Other Goals    Other Adult Goal- 1 Mom will encourage HEP to facilitate carryover and report back weekly on progress  -AL     Status: Other Adult Goal- 1 Progress slower than expected  -AL     Comments: Other Adult Goal- 1 Mom is encouraging use of pacing at home during the day. She has begun to notice a difference in Kourtney's speech at home. In conversation, Kourtney still talks super-fast and needs max cueing to slow down and over articulate. She needed this reminded 13X in conversation today. She even has a list of strategies in front of her.  -AL     Other Adult Goal- 2 Pt will eliminate lateralization during production of /s/ and /s/ blends.  -AL     Status: Other Adult Goal- 2 Progress slower than expected  -AL     Comments: Other Adult Goal- 2 In conversation, Kourtney still talks super-fast and  needs max cueing to slow down and over articulate. She produced /s/ and /z/ with 70% accuracy with mod cues. We targeted today /th/ today. She was able to produced /th/ in isolation with 25% accuracy with max cues. We targeted /r/ today and she was able to produce /r/ in isolation with 75% accuracy with mod cues.  We are also going to target /th/, /r blends/,and /Ch/.  -AL     Other Adult Goal- 3 In order to improve intellgibility , Kourtney will produce 3 syllable words in isolation with 80% accuracy.  -AL     Status: Other Adult Goal- 3 Progress slower than expected  -AL     Comments: Other Adult Goal- 3 Today with her 3 syllable words, she produced them with 70% accuracy with mod cues.  -AL        SLP Time Calculation    SLP Goal Re-Cert Due Date 06/08/23  -AL           User Key  (r) = Recorded By, (t) = Taken By, (c) = Cosigned By    Initials Name Provider Type    Vida Tejada, SLP Speech and Language Pathologist               OP SLP Education     Row Name 05/09/23 1100       Education    Barriers to Learning Decreased comprehension  -AL    Action Taken to Address Barriers education with mother for HEP  -AL    Education Provided Patient demonstrated recommended strategies;Family/caregivers demonstrated recommended strategies;Patient requires further education on strategies, risks;Family/caregivers require further education on strategies, risks  -AL    Assessed Learning needs;Learning motivation;Learning readiness;Learning preferences  -AL    Learning Motivation Moderate  -AL    Learning Method Explanation;Demonstration  -AL    Teaching Response Verbalized understanding;Demonstrated understanding  -AL    Education Comments Mom was given HTP papers with Kourtney's strategies to be utilized at home. The strategies from week to week are said being utilized at home, but there is no carryover between session.  -AL          User Key  (r) = Recorded By, (t) = Taken By, (c) = Cosigned By    Initials Name Effective  Dates    Vida Tejdaa SLP 09/22/22 -                      Time Calculation:   SLP Start Time: 1100  SLP Stop Time: 1153  SLP Time Calculation (min): 53 min  Untimed Charges  68228-MR Treatment/ST Modification Prosth Aug Alter : 53  Total Minutes  Untimed Charges Total Minutes: 53   Total Minutes: 53    Therapy Charges for Today     Code Description Service Date Service Provider Modifiers Qty    [id number] [facility] TREATMENT SPEECH 4 5/9/2023 Vida Clement, SLP GN 1                   Vida Clement M.S. CCC-SLP  5/9/2023

## 2023-05-16 ENCOUNTER — HOSPITAL ENCOUNTER (OUTPATIENT)
Dept: SPEECH THERAPY | Facility: HOSPITAL | Age: 23
Setting detail: THERAPIES SERIES
Discharge: HOME | End: 2023-05-16
Payer: MEDICAID

## 2023-05-16 DIAGNOSIS — R47.1 DYSARTHRIA: ICD-10-CM

## 2023-05-16 DIAGNOSIS — Q90.9 DOWN SYNDROME: Primary | ICD-10-CM

## 2023-05-16 PROCEDURE — 92507 TX SP LANG VOICE COMM INDIV: CPT

## 2023-05-16 NOTE — THERAPY TREATMENT NOTE
Outpatient Speech Language Pathology   Peds Speech Language Treatment Note  [facility]     Patient Name: Kourtney Townsend  : 2000  MRN: [id number]  Today's Date: 2023      Visit Date: 2023    There is no problem list on file for this patient.      Visit Dx:    ICD-10-CM ICD-9-CM   1. Down syndrome  Q90.9 758.0   2. Dysarthria  R47.1 784.51        OP SLP Assessment/Plan - 23 1100        SLP Assessment    Functional Problems Speech Language- Adult/Cognition  -AL    Impact on Function: Adult Speech Language/Cognition Difficulty communicating wants, needs, and ideas;Difficulty communicating in a medical emergency;Restrictions in personal and social life;Lack of insight or awareness of deficits, safety issues  -AL    Clinical Impression: Speech Language-Adult/Congnition Moderate-Severe:;Other (comment)   Dysarthria -AL    Functional Problems Comment Kourtney has decresed intelligibility that is associated with her down syndrome dx.  Her dx of ADHD also affects her ability to utilize compensatory strategies to improve her intelligibility.  -AL    Clinical Impression Comments Kourtney arrived to speech therapy energetic and in a good mood. Pt was in good spirits today. Therapy to focus on building her articulation skills to increase intelligibility to continue to build the skills and foundation so unfamiliar listeners can understand her. Kourtney still is very unintelligible when talking in conversation. Mom stated that often they cannot understand her at home. Kourtney’s speech is fast and lacks articulatory precision, which affects her overall intelligibility. Pt speech is fast and lacks articulatory precision, which affects her overall intelligibility. She is also dropping syllables in multisyllabic words. She drops the middle sound. She is understood about 65% of the time in connected speech to an unfamiliar listener.  She has learned strategies to increase intelligibility including slow  rate, pacing, and over articulation. She requires max cues to use strategies.  Mom indicates max cues are used at home to implement strategies. Today Kourtney tried out some oral motor exercises to strengthen her tongue. Tx focused on slowing rate and pacing at the syllable or word level in connected speech.  10 functional phrases were utilized. Kourtney easily completed this session. However, today she said her functional phrases very unintelligible with multiple reminders to be clear. In conversation, Kourtney still talks super-fast and needs max cueing to slow down and over articulate. She produced /s/ and /z/ with 70% accuracy with mod cues. We targeted today /th/ today. She was able to produced /th/ in isolation with 40% accuracy with max cues. We targeted /r/ today and she was able to produce /r/ in isolation with 75% accuracy with mod cues. We are also going to target /th/, /r blends/,and /Ch/. Today with her 3 syllable words, she produced them with 70% accuracy with mod cues. Mom is encouraging use of pacing at home during the day. She has begun to notice a difference in Kourtney's speech at home. In conversation, Kourtney still talks super-fast and needs max cueing to slow down and over articulate. She needed this reminded 13X in conversation today. She even has a list of strategies in front of her. Kourtney will continue to benefit from speech therapy to address education and implementation of compensatory strategies to improve intelligibility.  -AL    Please refer to paper survey for additional self-reported information Yes  -AL    Please refer to items scanned into chart for additional diagnostic informaiton and handouts as provided by clinician Yes  -AL    Prognosis Fair (comment)  -AL    Patient/caregiver participated in establishment of treatment plan and goals Yes  -AL    Patient would benefit from skilled therapy intervention Yes  -AL       SLP Plan    Frequency 1x per week  -AL    Duration 13/20   "-AL    Planned CPT's? SLP INDIVIDUAL SPEECH THERAPY: 74188  -AL    Expected Duration of Therapy Session (SLP [place]) 45  -AL    Plan Comments Try out new pacing and intellgibility strategies. The carryover from session to session and at home is not great.  -AL          User Key  (r) = Recorded By, (t) = Taken By, (c) = Cosigned By    Initials Name Provider Type    Vida Tejada, SLP Speech and Language Pathologist               SLP OP Goals     Row Name 05/16/23 1100          Goal Type Needed    Goal Type Needed Other Adult Goals;Dysarthria  -AL        Subjective Comments    Subjective Comments Pt was in good spirits and in a silly mood today.  -AL        Subjective Pain    Able to rate subjective pain? yes  -AL     Pre-Treatment Pain Level 0  -AL     Post-Treatment Pain Level 0  -AL     Subjective Pain Comment \"I feel great.\"  -AL        Dysarthria Goals    Patient will be able to communicate a message that is comprehensible to familiar/unfamiliar listeners utilizing verbal speech and augmentative strategies 80%:  -AL     Status: Patient will be able to communicate a message that is comprehensible to familiar/unfamiliar listeners utilizing verbal speech and augmentative strategies Progress slower than expected  -AL     Comments: Patient will be able to communicate a message that is comprehensible to familiar/unfamiliar listeners utilizing verbal speech and augmentative strategies Kourtney still is very unintelligible when talking in conversation. Mom stated that often they cannot understand her at home. Kourtney’s speech is fast and lacks articulatory precision, which affects her overall intelligibility. Pt speech is fast and lacks articulatory precision, which affects her overall intelligibility.  She has specific lateralization with production of /s/ and /z/, which affects her intelligibility. She is also dropping syllables in multisyllabic words. She drops the middle sound. She is understood about 65% of the " time in connected speech to an unfamiliar listener.  She has learned strategies to increase intelligibility including slow rate, pacing, and over articulation. She requires max cues to use strategies.  Mom indicates max cues are used at home to implement strategies.  -AL     Patient will apply compensatory strategies to improve intelligibility of speech during in spontaneous speech 80%:  -AL     Status: Patient will apply compensatory strategies to improve intelligibility of speech during in spontaneous speech Progress slower than expected  -AL     Comments: Patient will apply compensatory strategies to improve intelligibility of speech during in spontaneous speech Today Kourtney tried out some oral motor exercises to strengthen her tongue. Tx focused on slowing rate and pacing at the syllable or word level in connected speech.  10 functional phrases were utilized. Kourtney easily completed this session. However, today she said her functional phrases very unintelligible with multiple reminders to be clear,.  -AL        Other Goals    Other Adult Goal- 1 Mom will encourage HEP to facilitate carryover and report back weekly on progress  -AL     Status: Other Adult Goal- 1 Progress slower than expected  -AL     Comments: Other Adult Goal- 1 Mom is encouraging use of pacing at home during the day. She has begun to notice a difference in Kourtney's speech at home. In conversation, Kourtney still talks super-fast and needs max cueing to slow down and over articulate. She needed this reminded 10X in conversation today. She even has a list of strategies in front of her.  -AL     Other Adult Goal- 2 Pt will eliminate lateralization during production of /s/ and /s/ blends.  -AL     Status: Other Adult Goal- 2 Progress slower than expected  -AL     Comments: Other Adult Goal- 2 In conversation, Kourtney still talks super-fast and needs max cueing to slow down and over articulate. She produced /s/ and /z/ with 70% accuracy with  mod cues. We targeted today /th/ today. She was able to produced /th/ in isolation with 40% accuracy with max cues. We targeted /r/ today and she was able to produce /r/ in isolation with 75% accuracy with mod cues.  We are also going to target /th/, /r blends/,and /Ch/.  -AL     Other Adult Goal- 3 In order to improve intellgibility , Kourtney will produce 3 syllable words in isolation with 80% accuracy.  -AL     Status: Other Adult Goal- 3 Progress slower than expected  -AL     Comments: Other Adult Goal- 3 Today with her 3 syllable words, she produced them with 70% accuracy with mod cues.  -AL        SLP Time Calculation    SLP Goal Re-Cert Due Date 06/08/23  -AL           User Key  (r) = Recorded By, (t) = Taken By, (c) = Cosigned By    Initials Name Provider Type    Vida Tejada SLP Speech and Language Pathologist               OP SLP Education     Row Name 05/16/23 1100       Education    Barriers to Learning Decreased comprehension  -AL    Action Taken to Address Barriers education with mother for HEP  -AL    Education Provided Patient demonstrated recommended strategies;Family/caregivers demonstrated recommended strategies;Patient requires further education on strategies, risks;Family/caregivers require further education on strategies, risks  -AL    Assessed Learning needs;Learning motivation;Learning readiness;Learning preferences  -AL    Learning Motivation Moderate  -AL    Learning Method Explanation;Demonstration  -AL    Teaching Response Verbalized understanding;Demonstrated understanding  -AL    Education Comments Mom was given [facility] papers with Kourtney's strategies to be utilized at home. The strategies from week to week are said being utilized at home, but there is no carryover between session.  -AL          User Key  (r) = Recorded By, (t) = Taken By, (c) = Cosigned By    Initials Name Effective Dates    Vida Tejada SLP 09/22/22 -                    Time Calculation:   SLP Start Time:  1100  SLP Stop Time: 1145  SLP Time Calculation (min): 45 min  Untimed Charges  [id number] Treatment/ST Modification Prosth Aug Alter : 45  Total Minutes  Untimed Charges Total Minutes: 45   Total Minutes: 45    Therapy Charges for Today     Code Description Service Date Service Provider Modifiers Qty    [id number]  ST TREATMENT SPEECH 3 5/16/2023 Vida Clement, SLP GN 1                     Vida Clement M.S. CCC-SLP  5/16/2023

## 2023-05-23 ENCOUNTER — APPOINTMENT (OUTPATIENT)
Dept: SPEECH THERAPY | Facility: HOSPITAL | Age: 23
End: 2023-05-23
Payer: MEDICAID

## 2023-05-30 ENCOUNTER — APPOINTMENT (OUTPATIENT)
Dept: SPEECH THERAPY | Facility: HOSPITAL | Age: 23
End: 2023-05-30
Payer: MEDICAID